# Patient Record
Sex: FEMALE | Race: BLACK OR AFRICAN AMERICAN | NOT HISPANIC OR LATINO | Employment: FULL TIME | URBAN - METROPOLITAN AREA
[De-identification: names, ages, dates, MRNs, and addresses within clinical notes are randomized per-mention and may not be internally consistent; named-entity substitution may affect disease eponyms.]

---

## 2023-05-30 ENCOUNTER — LAB (OUTPATIENT)
Dept: LAB | Facility: HOSPITAL | Age: 18
End: 2023-05-30

## 2023-05-30 ENCOUNTER — TRANSCRIBE ORDERS (OUTPATIENT)
Dept: ADMINISTRATIVE | Facility: HOSPITAL | Age: 18
End: 2023-05-30

## 2023-05-30 DIAGNOSIS — J30.9 SPASMODIC RHINORRHEA: ICD-10-CM

## 2023-05-30 DIAGNOSIS — L20.9 ATOPIC DERMATITIS, UNSPECIFIED TYPE: ICD-10-CM

## 2023-05-30 DIAGNOSIS — J45.20 ASTHMA, MILD INTERMITTENT, POORLY CONTROLLED: ICD-10-CM

## 2023-05-30 DIAGNOSIS — J45.20 ASTHMA, MILD INTERMITTENT, POORLY CONTROLLED: Primary | ICD-10-CM

## 2023-05-30 LAB
ALBUMIN SERPL-MCNC: 4.4 G/DL (ref 3.5–5.2)
ALBUMIN/GLOB SERPL: 1.5 G/DL
ALP SERPL-CCNC: 68 U/L (ref 39–117)
ALT SERPL W P-5'-P-CCNC: 15 U/L (ref 1–33)
ANION GAP SERPL CALCULATED.3IONS-SCNC: 8 MMOL/L (ref 5–15)
AST SERPL-CCNC: 26 U/L (ref 1–32)
BASOPHILS # BLD AUTO: 0.06 10*3/MM3 (ref 0–0.2)
BASOPHILS NFR BLD AUTO: 0.4 % (ref 0–1.5)
BILIRUB SERPL-MCNC: 0.5 MG/DL (ref 0–1.2)
BUN SERPL-MCNC: 5 MG/DL (ref 6–20)
BUN/CREAT SERPL: 6.5 (ref 7–25)
CALCIUM SPEC-SCNC: 9.4 MG/DL (ref 8.6–10.5)
CHLORIDE SERPL-SCNC: 107 MMOL/L (ref 98–107)
CO2 SERPL-SCNC: 27 MMOL/L (ref 22–29)
CREAT SERPL-MCNC: 0.77 MG/DL (ref 0.57–1)
DEPRECATED RDW RBC AUTO: 38.5 FL (ref 37–54)
EGFRCR SERPLBLD CKD-EPI 2021: 113.4 ML/MIN/1.73
EOSINOPHIL # BLD AUTO: 2.95 10*3/MM3 (ref 0–0.4)
EOSINOPHIL NFR BLD AUTO: 20.9 % (ref 0.3–6.2)
ERYTHROCYTE [DISTWIDTH] IN BLOOD BY AUTOMATED COUNT: 11.7 % (ref 12.3–15.4)
GLOBULIN UR ELPH-MCNC: 2.9 GM/DL
GLUCOSE SERPL-MCNC: 90 MG/DL (ref 65–99)
HCT VFR BLD AUTO: 35.2 % (ref 34–46.6)
HGB BLD-MCNC: 11.6 G/DL (ref 12–15.9)
IMM GRANULOCYTES # BLD AUTO: 0.03 10*3/MM3 (ref 0–0.05)
IMM GRANULOCYTES NFR BLD AUTO: 0.2 % (ref 0–0.5)
LYMPHOCYTES # BLD AUTO: 3.43 10*3/MM3 (ref 0.7–3.1)
LYMPHOCYTES NFR BLD AUTO: 24.3 % (ref 19.6–45.3)
MCH RBC QN AUTO: 30.3 PG (ref 26.6–33)
MCHC RBC AUTO-ENTMCNC: 33 G/DL (ref 31.5–35.7)
MCV RBC AUTO: 91.9 FL (ref 79–97)
MONOCYTES # BLD AUTO: 0.94 10*3/MM3 (ref 0.1–0.9)
MONOCYTES NFR BLD AUTO: 6.7 % (ref 5–12)
NEUTROPHILS NFR BLD AUTO: 47.5 % (ref 42.7–76)
NEUTROPHILS NFR BLD AUTO: 6.68 10*3/MM3 (ref 1.7–7)
NRBC BLD AUTO-RTO: 0 /100 WBC (ref 0–0.2)
PLATELET # BLD AUTO: 301 10*3/MM3 (ref 140–450)
PMV BLD AUTO: 11.1 FL (ref 6–12)
POTASSIUM SERPL-SCNC: 4.3 MMOL/L (ref 3.5–5.2)
PROT SERPL-MCNC: 7.3 G/DL (ref 6–8.5)
RBC # BLD AUTO: 3.83 10*6/MM3 (ref 3.77–5.28)
SODIUM SERPL-SCNC: 142 MMOL/L (ref 136–145)
WBC NRBC COR # BLD: 14.09 10*3/MM3 (ref 3.4–10.8)

## 2023-05-30 PROCEDURE — 80053 COMPREHEN METABOLIC PANEL: CPT

## 2023-05-30 PROCEDURE — 85025 COMPLETE CBC W/AUTO DIFF WBC: CPT

## 2023-05-30 PROCEDURE — 36415 COLL VENOUS BLD VENIPUNCTURE: CPT

## 2023-06-14 ENCOUNTER — HOSPITAL ENCOUNTER (OUTPATIENT)
Dept: GENERAL RADIOLOGY | Facility: HOSPITAL | Age: 18
Discharge: HOME | End: 2023-06-14
Payer: COMMERCIAL

## 2023-06-14 ENCOUNTER — TRANSCRIBE ORDERS (OUTPATIENT)
Dept: ADMINISTRATIVE | Facility: HOSPITAL | Age: 18
End: 2023-06-14
Payer: COMMERCIAL

## 2023-06-14 ENCOUNTER — LAB (OUTPATIENT)
Dept: LAB | Facility: HOSPITAL | Age: 18
End: 2023-06-14
Payer: COMMERCIAL

## 2023-06-14 DIAGNOSIS — J45.20 ASTHMA, MILD INTERMITTENT, POORLY CONTROLLED: Primary | ICD-10-CM

## 2023-06-14 DIAGNOSIS — L20.9 ATOPIC DERMATITIS, UNSPECIFIED TYPE: ICD-10-CM

## 2023-06-14 DIAGNOSIS — R05.1 ACUTE COUGH: ICD-10-CM

## 2023-06-14 DIAGNOSIS — R05.8 OTHER COUGH: ICD-10-CM

## 2023-06-14 DIAGNOSIS — J30.9 SPASMODIC RHINORRHEA: ICD-10-CM

## 2023-06-14 DIAGNOSIS — J45.20 ASTHMA, MILD INTERMITTENT, POORLY CONTROLLED: ICD-10-CM

## 2023-06-14 LAB
ALBUMIN SERPL-MCNC: 4.5 G/DL (ref 3.5–5.2)
ALBUMIN/GLOB SERPL: 1.4 G/DL
ALP SERPL-CCNC: 58 U/L (ref 39–117)
ALT SERPL W P-5'-P-CCNC: 13 U/L (ref 1–33)
ANION GAP SERPL CALCULATED.3IONS-SCNC: 13.4 MMOL/L (ref 5–15)
AST SERPL-CCNC: 13 U/L (ref 1–32)
BASOPHILS # BLD AUTO: 0.05 10*3/MM3 (ref 0–0.2)
BASOPHILS NFR BLD AUTO: 0.3 % (ref 0–1.5)
BILIRUB SERPL-MCNC: 0.5 MG/DL (ref 0–1.2)
BUN SERPL-MCNC: 14 MG/DL (ref 6–20)
BUN/CREAT SERPL: 13.7 (ref 7–25)
CALCIUM SPEC-SCNC: 9.8 MG/DL (ref 8.6–10.5)
CHLORIDE SERPL-SCNC: 103 MMOL/L (ref 98–107)
CHOLEST SERPL-MCNC: 200 MG/DL (ref 0–200)
CO2 SERPL-SCNC: 25.6 MMOL/L (ref 22–29)
CREAT SERPL-MCNC: 1.02 MG/DL (ref 0.57–1)
DEPRECATED RDW RBC AUTO: 39 FL (ref 37–54)
EGFRCR SERPLBLD CKD-EPI 2021: 80.9 ML/MIN/1.73
EOSINOPHIL # BLD AUTO: 0.05 10*3/MM3 (ref 0–0.4)
EOSINOPHIL NFR BLD AUTO: 0.3 % (ref 0.3–6.2)
ERYTHROCYTE [DISTWIDTH] IN BLOOD BY AUTOMATED COUNT: 11.5 % (ref 12.3–15.4)
GLOBULIN UR ELPH-MCNC: 3.3 GM/DL
GLUCOSE SERPL-MCNC: 81 MG/DL (ref 65–99)
HAV IGM SERPL QL IA: NORMAL
HBV CORE IGM SERPL QL IA: NORMAL
HBV SURFACE AG SERPL QL IA: NORMAL
HCT VFR BLD AUTO: 38.9 % (ref 34–46.6)
HCV AB SER DONR QL: NORMAL
HDLC SERPL-MCNC: 34 MG/DL (ref 40–60)
HGB BLD-MCNC: 12.8 G/DL (ref 12–15.9)
IMM GRANULOCYTES # BLD AUTO: 0.06 10*3/MM3 (ref 0–0.05)
IMM GRANULOCYTES NFR BLD AUTO: 0.4 % (ref 0–0.5)
LDLC SERPL CALC-MCNC: 119 MG/DL (ref 0–100)
LDLC/HDLC SERPL: 3.31 {RATIO}
LYMPHOCYTES # BLD AUTO: 5.84 10*3/MM3 (ref 0.7–3.1)
LYMPHOCYTES NFR BLD AUTO: 34.3 % (ref 19.6–45.3)
MCH RBC QN AUTO: 30 PG (ref 26.6–33)
MCHC RBC AUTO-ENTMCNC: 32.9 G/DL (ref 31.5–35.7)
MCV RBC AUTO: 91.3 FL (ref 79–97)
MONOCYTES # BLD AUTO: 0.99 10*3/MM3 (ref 0.1–0.9)
MONOCYTES NFR BLD AUTO: 5.8 % (ref 5–12)
NEUTROPHILS NFR BLD AUTO: 10.06 10*3/MM3 (ref 1.7–7)
NEUTROPHILS NFR BLD AUTO: 58.9 % (ref 42.7–76)
NRBC BLD AUTO-RTO: 0 /100 WBC (ref 0–0.2)
PLATELET # BLD AUTO: 389 10*3/MM3 (ref 140–450)
PMV BLD AUTO: 10.4 FL (ref 6–12)
POTASSIUM SERPL-SCNC: 3.7 MMOL/L (ref 3.5–5.2)
PROT SERPL-MCNC: 7.8 G/DL (ref 6–8.5)
RBC # BLD AUTO: 4.26 10*6/MM3 (ref 3.77–5.28)
SODIUM SERPL-SCNC: 142 MMOL/L (ref 136–145)
TRIGL SERPL-MCNC: 268 MG/DL (ref 0–150)
VLDLC SERPL-MCNC: 47 MG/DL (ref 5–40)
WBC NRBC COR # BLD: 17.05 10*3/MM3 (ref 3.4–10.8)

## 2023-06-14 PROCEDURE — 86480 TB TEST CELL IMMUN MEASURE: CPT

## 2023-06-14 PROCEDURE — 85025 COMPLETE CBC W/AUTO DIFF WBC: CPT

## 2023-06-14 PROCEDURE — 80053 COMPREHEN METABOLIC PANEL: CPT

## 2023-06-14 PROCEDURE — 36415 COLL VENOUS BLD VENIPUNCTURE: CPT

## 2023-06-14 PROCEDURE — 80061 LIPID PANEL: CPT

## 2023-06-14 PROCEDURE — 80074 ACUTE HEPATITIS PANEL: CPT

## 2023-06-14 PROCEDURE — 71046 X-RAY EXAM CHEST 2 VIEWS: CPT

## 2023-06-16 LAB
GAMMA INTERFERON BACKGROUND BLD IA-ACNC: 0 IU/ML
M TB IFN-G BLD-IMP: NEGATIVE
M TB IFN-G CD4+ T-CELLS BLD-ACNC: 0 IU/ML
M TBIFN-G CD4+ CD8+T-CELLS BLD-ACNC: 0.02 IU/ML
MITOGEN IGNF BLD-ACNC: >10 IU/ML
QUANTIFERON INCUBATION: NORMAL
SERVICE CMNT-IMP: NORMAL

## 2023-08-23 ENCOUNTER — LAB (OUTPATIENT)
Dept: LAB | Facility: HOSPITAL | Age: 18
End: 2023-08-23
Payer: COMMERCIAL

## 2023-08-23 ENCOUNTER — APPOINTMENT (OUTPATIENT)
Facility: HOSPITAL | Age: 18
End: 2023-08-23
Payer: COMMERCIAL

## 2023-08-23 ENCOUNTER — TRANSCRIBE ORDERS (OUTPATIENT)
Dept: ADMINISTRATIVE | Facility: HOSPITAL | Age: 18
End: 2023-08-23
Payer: COMMERCIAL

## 2023-08-23 DIAGNOSIS — L20.9 ATOPIC DERMATITIS, UNSPECIFIED TYPE: Primary | ICD-10-CM

## 2023-08-23 DIAGNOSIS — L20.9 ATOPIC DERMATITIS, UNSPECIFIED TYPE: ICD-10-CM

## 2023-08-23 LAB
ALBUMIN SERPL-MCNC: 4.3 G/DL (ref 3.5–5.2)
ALBUMIN/GLOB SERPL: 1.5 G/DL
ALP SERPL-CCNC: 50 U/L (ref 39–117)
ALT SERPL W P-5'-P-CCNC: 13 U/L (ref 1–33)
ANION GAP SERPL CALCULATED.3IONS-SCNC: 11.4 MMOL/L (ref 5–15)
AST SERPL-CCNC: 16 U/L (ref 1–32)
BASOPHILS # BLD AUTO: 0.05 10*3/MM3 (ref 0–0.2)
BASOPHILS NFR BLD AUTO: 0.5 % (ref 0–1.5)
BILIRUB SERPL-MCNC: 0.7 MG/DL (ref 0–1.2)
BUN SERPL-MCNC: 10 MG/DL (ref 6–20)
BUN/CREAT SERPL: 11.9 (ref 7–25)
CALCIUM SPEC-SCNC: 9.8 MG/DL (ref 8.6–10.5)
CHLORIDE SERPL-SCNC: 105 MMOL/L (ref 98–107)
CHOLEST SERPL-MCNC: 161 MG/DL (ref 0–200)
CO2 SERPL-SCNC: 24.6 MMOL/L (ref 22–29)
CREAT SERPL-MCNC: 0.84 MG/DL (ref 0.57–1)
DEPRECATED RDW RBC AUTO: 42.5 FL (ref 37–54)
EGFRCR SERPLBLD CKD-EPI 2021: 102.2 ML/MIN/1.73
EOSINOPHIL # BLD AUTO: 0.38 10*3/MM3 (ref 0–0.4)
EOSINOPHIL NFR BLD AUTO: 3.7 % (ref 0.3–6.2)
ERYTHROCYTE [DISTWIDTH] IN BLOOD BY AUTOMATED COUNT: 12.4 % (ref 12.3–15.4)
GLOBULIN UR ELPH-MCNC: 2.9 GM/DL
GLUCOSE SERPL-MCNC: 78 MG/DL (ref 65–99)
HCT VFR BLD AUTO: 36.1 % (ref 34–46.6)
HDLC SERPL-MCNC: 39 MG/DL (ref 40–60)
HGB BLD-MCNC: 11.8 G/DL (ref 12–15.9)
IMM GRANULOCYTES # BLD AUTO: 0.04 10*3/MM3 (ref 0–0.05)
IMM GRANULOCYTES NFR BLD AUTO: 0.4 % (ref 0–0.5)
LDLC SERPL CALC-MCNC: 93 MG/DL (ref 0–100)
LDLC/HDLC SERPL: 2.28 {RATIO}
LYMPHOCYTES # BLD AUTO: 3.14 10*3/MM3 (ref 0.7–3.1)
LYMPHOCYTES NFR BLD AUTO: 30.5 % (ref 19.6–45.3)
MCH RBC QN AUTO: 30.6 PG (ref 26.6–33)
MCHC RBC AUTO-ENTMCNC: 32.7 G/DL (ref 31.5–35.7)
MCV RBC AUTO: 93.8 FL (ref 79–97)
MONOCYTES # BLD AUTO: 0.62 10*3/MM3 (ref 0.1–0.9)
MONOCYTES NFR BLD AUTO: 6 % (ref 5–12)
NEUTROPHILS NFR BLD AUTO: 58.9 % (ref 42.7–76)
NEUTROPHILS NFR BLD AUTO: 6.05 10*3/MM3 (ref 1.7–7)
NRBC BLD AUTO-RTO: 0 /100 WBC (ref 0–0.2)
PLATELET # BLD AUTO: 277 10*3/MM3 (ref 140–450)
PMV BLD AUTO: 11.9 FL (ref 6–12)
POTASSIUM SERPL-SCNC: 3.8 MMOL/L (ref 3.5–5.2)
PROT SERPL-MCNC: 7.2 G/DL (ref 6–8.5)
RBC # BLD AUTO: 3.85 10*6/MM3 (ref 3.77–5.28)
SODIUM SERPL-SCNC: 141 MMOL/L (ref 136–145)
TRIGL SERPL-MCNC: 166 MG/DL (ref 0–150)
VLDLC SERPL-MCNC: 29 MG/DL (ref 5–40)
WBC NRBC COR # BLD: 10.28 10*3/MM3 (ref 3.4–10.8)

## 2023-08-23 PROCEDURE — 80061 LIPID PANEL: CPT

## 2023-08-23 PROCEDURE — 80053 COMPREHEN METABOLIC PANEL: CPT

## 2023-08-23 PROCEDURE — 72110 X-RAY EXAM L-2 SPINE 4/>VWS: CPT

## 2023-08-23 PROCEDURE — 36415 COLL VENOUS BLD VENIPUNCTURE: CPT

## 2023-08-23 PROCEDURE — 85025 COMPLETE CBC W/AUTO DIFF WBC: CPT

## 2023-11-21 ENCOUNTER — TRANSCRIBE ORDERS (OUTPATIENT)
Dept: ADMINISTRATIVE | Facility: HOSPITAL | Age: 18
End: 2023-11-21
Payer: COMMERCIAL

## 2023-11-21 ENCOUNTER — LAB (OUTPATIENT)
Dept: LAB | Facility: HOSPITAL | Age: 18
End: 2023-11-21
Payer: COMMERCIAL

## 2023-11-21 DIAGNOSIS — J30.1 ALLERGIC RHINITIS DUE TO POLLEN, UNSPECIFIED SEASONALITY: ICD-10-CM

## 2023-11-21 DIAGNOSIS — L20.9 ATOPIC DERMATITIS, UNSPECIFIED TYPE: ICD-10-CM

## 2023-11-21 DIAGNOSIS — J45.40 ASTHMA, MODERATE PERSISTENT, WELL-CONTROLLED: ICD-10-CM

## 2023-11-21 DIAGNOSIS — J45.40 ASTHMA, MODERATE PERSISTENT, WELL-CONTROLLED: Primary | ICD-10-CM

## 2023-11-21 LAB
ALBUMIN SERPL-MCNC: 4.7 G/DL (ref 3.5–5.2)
ALBUMIN/GLOB SERPL: 1.7 G/DL
ALP SERPL-CCNC: 59 U/L (ref 39–117)
ALT SERPL W P-5'-P-CCNC: 14 U/L (ref 1–33)
ANION GAP SERPL CALCULATED.3IONS-SCNC: 10.2 MMOL/L (ref 5–15)
AST SERPL-CCNC: 16 U/L (ref 1–32)
BASOPHILS # BLD AUTO: 0.07 10*3/MM3 (ref 0–0.2)
BASOPHILS NFR BLD AUTO: 0.6 % (ref 0–1.5)
BILIRUB SERPL-MCNC: 0.2 MG/DL (ref 0–1.2)
BUN SERPL-MCNC: 6 MG/DL (ref 6–20)
BUN/CREAT SERPL: 8.2 (ref 7–25)
CALCIUM SPEC-SCNC: 9.3 MG/DL (ref 8.6–10.5)
CHLORIDE SERPL-SCNC: 104 MMOL/L (ref 98–107)
CHOLEST SERPL-MCNC: 171 MG/DL (ref 0–200)
CO2 SERPL-SCNC: 26.8 MMOL/L (ref 22–29)
CREAT SERPL-MCNC: 0.73 MG/DL (ref 0.57–1)
DEPRECATED RDW RBC AUTO: 43 FL (ref 37–54)
EGFRCR SERPLBLD CKD-EPI 2021: 120.9 ML/MIN/1.73
EOSINOPHIL # BLD AUTO: 0.32 10*3/MM3 (ref 0–0.4)
EOSINOPHIL NFR BLD AUTO: 2.6 % (ref 0.3–6.2)
ERYTHROCYTE [DISTWIDTH] IN BLOOD BY AUTOMATED COUNT: 12.3 % (ref 12.3–15.4)
GLOBULIN UR ELPH-MCNC: 2.8 GM/DL
GLUCOSE SERPL-MCNC: 100 MG/DL (ref 65–99)
HCT VFR BLD AUTO: 33.4 % (ref 34–46.6)
HDLC SERPL-MCNC: 33 MG/DL (ref 40–60)
HGB BLD-MCNC: 11.4 G/DL (ref 12–15.9)
IMM GRANULOCYTES # BLD AUTO: 0.03 10*3/MM3 (ref 0–0.05)
IMM GRANULOCYTES NFR BLD AUTO: 0.2 % (ref 0–0.5)
LDLC SERPL CALC-MCNC: 68 MG/DL (ref 0–100)
LDLC/HDLC SERPL: 1.47 {RATIO}
LYMPHOCYTES # BLD AUTO: 3.94 10*3/MM3 (ref 0.7–3.1)
LYMPHOCYTES NFR BLD AUTO: 32.5 % (ref 19.6–45.3)
MCH RBC QN AUTO: 32.1 PG (ref 26.6–33)
MCHC RBC AUTO-ENTMCNC: 34.1 G/DL (ref 31.5–35.7)
MCV RBC AUTO: 94.1 FL (ref 79–97)
MONOCYTES # BLD AUTO: 0.73 10*3/MM3 (ref 0.1–0.9)
MONOCYTES NFR BLD AUTO: 6 % (ref 5–12)
NEUTROPHILS NFR BLD AUTO: 58.1 % (ref 42.7–76)
NEUTROPHILS NFR BLD AUTO: 7.02 10*3/MM3 (ref 1.7–7)
NRBC BLD AUTO-RTO: 0 /100 WBC (ref 0–0.2)
PLATELET # BLD AUTO: 298 10*3/MM3 (ref 140–450)
PMV BLD AUTO: 11.2 FL (ref 6–12)
POTASSIUM SERPL-SCNC: 3.9 MMOL/L (ref 3.5–5.2)
PROT SERPL-MCNC: 7.5 G/DL (ref 6–8.5)
RBC # BLD AUTO: 3.55 10*6/MM3 (ref 3.77–5.28)
SODIUM SERPL-SCNC: 141 MMOL/L (ref 136–145)
TRIGL SERPL-MCNC: 447 MG/DL (ref 0–150)
VLDLC SERPL-MCNC: 70 MG/DL (ref 5–40)
WBC NRBC COR # BLD AUTO: 12.11 10*3/MM3 (ref 3.4–10.8)

## 2023-11-21 PROCEDURE — 80053 COMPREHEN METABOLIC PANEL: CPT

## 2023-11-21 PROCEDURE — 80061 LIPID PANEL: CPT

## 2023-11-21 PROCEDURE — 85025 COMPLETE CBC W/AUTO DIFF WBC: CPT

## 2023-11-21 PROCEDURE — 36415 COLL VENOUS BLD VENIPUNCTURE: CPT

## 2024-02-19 ENCOUNTER — TRANSCRIBE ORDERS (OUTPATIENT)
Dept: ADMINISTRATIVE | Facility: HOSPITAL | Age: 19
End: 2024-02-19
Payer: COMMERCIAL

## 2024-02-19 ENCOUNTER — LAB (OUTPATIENT)
Facility: HOSPITAL | Age: 19
End: 2024-02-19
Payer: COMMERCIAL

## 2024-02-19 DIAGNOSIS — L20.9 ATOPIC DERMATITIS, UNSPECIFIED TYPE: Primary | ICD-10-CM

## 2024-02-19 DIAGNOSIS — J30.1 ALLERGIC RHINITIS DUE TO POLLEN, UNSPECIFIED SEASONALITY: ICD-10-CM

## 2024-02-19 DIAGNOSIS — J45.40 MODERATE PERSISTENT ASTHMA, UNCOMPLICATED: ICD-10-CM

## 2024-02-19 DIAGNOSIS — L20.9 ATOPIC DERMATITIS, UNSPECIFIED TYPE: ICD-10-CM

## 2024-02-19 LAB
ALBUMIN SERPL-MCNC: 4.4 G/DL (ref 3.5–5.2)
ALBUMIN/GLOB SERPL: 1.5 G/DL
ALP SERPL-CCNC: 53 U/L (ref 39–117)
ALT SERPL W P-5'-P-CCNC: 24 U/L (ref 1–33)
ANION GAP SERPL CALCULATED.3IONS-SCNC: 11.6 MMOL/L (ref 5–15)
AST SERPL-CCNC: 22 U/L (ref 1–32)
BASOPHILS # BLD AUTO: 0.06 10*3/MM3 (ref 0–0.2)
BASOPHILS NFR BLD AUTO: 0.6 % (ref 0–1.5)
BILIRUB SERPL-MCNC: 0.4 MG/DL (ref 0–1.2)
BUN SERPL-MCNC: 7 MG/DL (ref 6–20)
BUN/CREAT SERPL: 8.9 (ref 7–25)
CALCIUM SPEC-SCNC: 9.4 MG/DL (ref 8.6–10.5)
CHLORIDE SERPL-SCNC: 105 MMOL/L (ref 98–107)
CHOLEST SERPL-MCNC: 180 MG/DL (ref 0–200)
CO2 SERPL-SCNC: 25.4 MMOL/L (ref 22–29)
CREAT SERPL-MCNC: 0.79 MG/DL (ref 0.57–1)
DEPRECATED RDW RBC AUTO: 41.4 FL (ref 37–54)
EGFRCR SERPLBLD CKD-EPI 2021: 109.3 ML/MIN/1.73
EOSINOPHIL # BLD AUTO: 0.32 10*3/MM3 (ref 0–0.4)
EOSINOPHIL NFR BLD AUTO: 3.2 % (ref 0.3–6.2)
ERYTHROCYTE [DISTWIDTH] IN BLOOD BY AUTOMATED COUNT: 12.2 % (ref 12.3–15.4)
GLOBULIN UR ELPH-MCNC: 2.9 GM/DL
GLUCOSE SERPL-MCNC: 104 MG/DL (ref 65–99)
HCT VFR BLD AUTO: 34.3 % (ref 34–46.6)
HDLC SERPL-MCNC: 38 MG/DL (ref 40–60)
HGB BLD-MCNC: 11.5 G/DL (ref 12–15.9)
IMM GRANULOCYTES # BLD AUTO: 0.02 10*3/MM3 (ref 0–0.05)
IMM GRANULOCYTES NFR BLD AUTO: 0.2 % (ref 0–0.5)
LDLC SERPL CALC-MCNC: 83 MG/DL (ref 0–100)
LDLC/HDLC SERPL: 1.84 {RATIO}
LYMPHOCYTES # BLD AUTO: 3.73 10*3/MM3 (ref 0.7–3.1)
LYMPHOCYTES NFR BLD AUTO: 37 % (ref 19.6–45.3)
MCH RBC QN AUTO: 31.3 PG (ref 26.6–33)
MCHC RBC AUTO-ENTMCNC: 33.5 G/DL (ref 31.5–35.7)
MCV RBC AUTO: 93.5 FL (ref 79–97)
MONOCYTES # BLD AUTO: 0.62 10*3/MM3 (ref 0.1–0.9)
MONOCYTES NFR BLD AUTO: 6.2 % (ref 5–12)
NEUTROPHILS NFR BLD AUTO: 5.33 10*3/MM3 (ref 1.7–7)
NEUTROPHILS NFR BLD AUTO: 52.8 % (ref 42.7–76)
NRBC BLD AUTO-RTO: 0 /100 WBC (ref 0–0.2)
PLATELET # BLD AUTO: 280 10*3/MM3 (ref 140–450)
PMV BLD AUTO: 11.5 FL (ref 6–12)
POTASSIUM SERPL-SCNC: 4.4 MMOL/L (ref 3.5–5.2)
PROT SERPL-MCNC: 7.3 G/DL (ref 6–8.5)
RBC # BLD AUTO: 3.67 10*6/MM3 (ref 3.77–5.28)
SODIUM SERPL-SCNC: 142 MMOL/L (ref 136–145)
TRIGL SERPL-MCNC: 360 MG/DL (ref 0–150)
VLDLC SERPL-MCNC: 59 MG/DL (ref 5–40)
WBC NRBC COR # BLD AUTO: 10.08 10*3/MM3 (ref 3.4–10.8)

## 2024-02-19 PROCEDURE — 80053 COMPREHEN METABOLIC PANEL: CPT

## 2024-02-19 PROCEDURE — 36415 COLL VENOUS BLD VENIPUNCTURE: CPT

## 2024-02-19 PROCEDURE — 80061 LIPID PANEL: CPT

## 2024-02-19 PROCEDURE — 85025 COMPLETE CBC W/AUTO DIFF WBC: CPT

## 2024-08-20 ENCOUNTER — LAB (OUTPATIENT)
Dept: LAB | Facility: HOSPITAL | Age: 19
End: 2024-08-20
Payer: COMMERCIAL

## 2024-08-20 ENCOUNTER — TRANSCRIBE ORDERS (OUTPATIENT)
Dept: ADMINISTRATIVE | Facility: HOSPITAL | Age: 19
End: 2024-08-20
Payer: COMMERCIAL

## 2024-08-20 DIAGNOSIS — J30.1 ALLERGIC RHINITIS DUE TO POLLEN, UNSPECIFIED SEASONALITY: ICD-10-CM

## 2024-08-20 DIAGNOSIS — J45.40 MODERATE PERSISTENT ASTHMA, UNCOMPLICATED: ICD-10-CM

## 2024-08-20 DIAGNOSIS — L20.9 ATOPIC DERMATITIS, UNSPECIFIED TYPE: ICD-10-CM

## 2024-08-20 DIAGNOSIS — L20.9 ATOPIC DERMATITIS, UNSPECIFIED TYPE: Primary | ICD-10-CM

## 2024-08-20 LAB
ALBUMIN SERPL-MCNC: 4.7 G/DL (ref 3.5–5.2)
ALBUMIN/GLOB SERPL: 1.7 G/DL
ALP SERPL-CCNC: 59 U/L (ref 39–117)
ALT SERPL W P-5'-P-CCNC: 59 U/L (ref 1–33)
ANION GAP SERPL CALCULATED.3IONS-SCNC: 11.8 MMOL/L (ref 5–15)
AST SERPL-CCNC: 53 U/L (ref 1–32)
BASOPHILS # BLD AUTO: 0.04 10*3/MM3 (ref 0–0.2)
BASOPHILS NFR BLD AUTO: 0.5 % (ref 0–1.5)
BILIRUB SERPL-MCNC: 0.5 MG/DL (ref 0–1.2)
BUN SERPL-MCNC: 7 MG/DL (ref 6–20)
BUN/CREAT SERPL: 9.2 (ref 7–25)
CALCIUM SPEC-SCNC: 9.2 MG/DL (ref 8.6–10.5)
CHLORIDE SERPL-SCNC: 101 MMOL/L (ref 98–107)
CHOLEST SERPL-MCNC: 162 MG/DL (ref 0–200)
CO2 SERPL-SCNC: 24.2 MMOL/L (ref 22–29)
CREAT SERPL-MCNC: 0.76 MG/DL (ref 0.57–1)
DEPRECATED RDW RBC AUTO: 43.5 FL (ref 37–54)
EGFRCR SERPLBLD CKD-EPI 2021: 114.5 ML/MIN/1.73
EOSINOPHIL # BLD AUTO: 0.34 10*3/MM3 (ref 0–0.4)
EOSINOPHIL NFR BLD AUTO: 3.9 % (ref 0.3–6.2)
ERYTHROCYTE [DISTWIDTH] IN BLOOD BY AUTOMATED COUNT: 12.5 % (ref 12.3–15.4)
GLOBULIN UR ELPH-MCNC: 2.8 GM/DL
GLUCOSE SERPL-MCNC: 96 MG/DL (ref 65–99)
HCT VFR BLD AUTO: 36 % (ref 34–46.6)
HDLC SERPL-MCNC: 35 MG/DL (ref 40–60)
HGB BLD-MCNC: 12 G/DL (ref 12–15.9)
IMM GRANULOCYTES # BLD AUTO: 0.03 10*3/MM3 (ref 0–0.05)
IMM GRANULOCYTES NFR BLD AUTO: 0.3 % (ref 0–0.5)
LDLC SERPL CALC-MCNC: 72 MG/DL (ref 0–100)
LDLC/HDLC SERPL: 1.65 {RATIO}
LYMPHOCYTES # BLD AUTO: 3.01 10*3/MM3 (ref 0.7–3.1)
LYMPHOCYTES NFR BLD AUTO: 34.4 % (ref 19.6–45.3)
MCH RBC QN AUTO: 32 PG (ref 26.6–33)
MCHC RBC AUTO-ENTMCNC: 33.3 G/DL (ref 31.5–35.7)
MCV RBC AUTO: 96 FL (ref 79–97)
MONOCYTES # BLD AUTO: 0.53 10*3/MM3 (ref 0.1–0.9)
MONOCYTES NFR BLD AUTO: 6.1 % (ref 5–12)
NEUTROPHILS NFR BLD AUTO: 4.81 10*3/MM3 (ref 1.7–7)
NEUTROPHILS NFR BLD AUTO: 54.8 % (ref 42.7–76)
NRBC BLD AUTO-RTO: 0 /100 WBC (ref 0–0.2)
PLATELET # BLD AUTO: 252 10*3/MM3 (ref 140–450)
PMV BLD AUTO: 11.5 FL (ref 6–12)
POTASSIUM SERPL-SCNC: 3.8 MMOL/L (ref 3.5–5.2)
PROT SERPL-MCNC: 7.5 G/DL (ref 6–8.5)
RBC # BLD AUTO: 3.75 10*6/MM3 (ref 3.77–5.28)
SODIUM SERPL-SCNC: 137 MMOL/L (ref 136–145)
TRIGL SERPL-MCNC: 347 MG/DL (ref 0–150)
VLDLC SERPL-MCNC: 55 MG/DL (ref 5–40)
WBC NRBC COR # BLD AUTO: 8.76 10*3/MM3 (ref 3.4–10.8)

## 2024-08-20 PROCEDURE — 80053 COMPREHEN METABOLIC PANEL: CPT

## 2024-08-20 PROCEDURE — 85025 COMPLETE CBC W/AUTO DIFF WBC: CPT

## 2024-08-20 PROCEDURE — 80061 LIPID PANEL: CPT

## 2024-08-20 PROCEDURE — 36415 COLL VENOUS BLD VENIPUNCTURE: CPT

## 2024-09-25 ENCOUNTER — TRANSCRIBE ORDERS (OUTPATIENT)
Dept: ADMINISTRATIVE | Facility: HOSPITAL | Age: 19
End: 2024-09-25
Payer: COMMERCIAL

## 2024-09-25 ENCOUNTER — LAB (OUTPATIENT)
Dept: LAB | Facility: HOSPITAL | Age: 19
End: 2024-09-25
Payer: COMMERCIAL

## 2024-09-25 DIAGNOSIS — L20.9 ATOPIC DERMATITIS, UNSPECIFIED TYPE: ICD-10-CM

## 2024-09-25 DIAGNOSIS — J30.1 ALLERGIC RHINITIS DUE TO POLLEN, UNSPECIFIED SEASONALITY: ICD-10-CM

## 2024-09-25 DIAGNOSIS — J45.40 MODERATE PERSISTENT ASTHMA, UNCOMPLICATED: ICD-10-CM

## 2024-09-25 DIAGNOSIS — J45.40 MODERATE PERSISTENT ASTHMA, UNCOMPLICATED: Primary | ICD-10-CM

## 2024-09-25 LAB
ALBUMIN SERPL-MCNC: 4.9 G/DL (ref 3.5–5.2)
ALBUMIN/GLOB SERPL: 1.8 G/DL
ALP SERPL-CCNC: 69 U/L (ref 39–117)
ALT SERPL W P-5'-P-CCNC: 87 U/L (ref 1–33)
ANION GAP SERPL CALCULATED.3IONS-SCNC: 14 MMOL/L (ref 5–15)
AST SERPL-CCNC: 69 U/L (ref 1–32)
BASOPHILS # BLD AUTO: 0.06 10*3/MM3 (ref 0–0.2)
BASOPHILS NFR BLD AUTO: 0.5 % (ref 0–1.5)
BILIRUB SERPL-MCNC: 0.6 MG/DL (ref 0–1.2)
BUN SERPL-MCNC: 10 MG/DL (ref 6–20)
BUN/CREAT SERPL: 12.2 (ref 7–25)
CALCIUM SPEC-SCNC: 10 MG/DL (ref 8.6–10.5)
CHLORIDE SERPL-SCNC: 100 MMOL/L (ref 98–107)
CO2 SERPL-SCNC: 26 MMOL/L (ref 22–29)
CREAT SERPL-MCNC: 0.82 MG/DL (ref 0.57–1)
DEPRECATED RDW RBC AUTO: 45.5 FL (ref 37–54)
EGFRCR SERPLBLD CKD-EPI 2021: 104.5 ML/MIN/1.73
EOSINOPHIL # BLD AUTO: 0.24 10*3/MM3 (ref 0–0.4)
EOSINOPHIL NFR BLD AUTO: 2 % (ref 0.3–6.2)
ERYTHROCYTE [DISTWIDTH] IN BLOOD BY AUTOMATED COUNT: 12.5 % (ref 12.3–15.4)
GLOBULIN UR ELPH-MCNC: 2.8 GM/DL
GLUCOSE SERPL-MCNC: 102 MG/DL (ref 65–99)
HCT VFR BLD AUTO: 38.8 % (ref 34–46.6)
HGB BLD-MCNC: 12.4 G/DL (ref 12–15.9)
IMM GRANULOCYTES # BLD AUTO: 0.06 10*3/MM3 (ref 0–0.05)
IMM GRANULOCYTES NFR BLD AUTO: 0.5 % (ref 0–0.5)
LYMPHOCYTES # BLD AUTO: 4.06 10*3/MM3 (ref 0.7–3.1)
LYMPHOCYTES NFR BLD AUTO: 34.3 % (ref 19.6–45.3)
MCH RBC QN AUTO: 31.4 PG (ref 26.6–33)
MCHC RBC AUTO-ENTMCNC: 32 G/DL (ref 31.5–35.7)
MCV RBC AUTO: 98.2 FL (ref 79–97)
MONOCYTES # BLD AUTO: 0.69 10*3/MM3 (ref 0.1–0.9)
MONOCYTES NFR BLD AUTO: 5.8 % (ref 5–12)
NEUTROPHILS NFR BLD AUTO: 56.9 % (ref 42.7–76)
NEUTROPHILS NFR BLD AUTO: 6.74 10*3/MM3 (ref 1.7–7)
NRBC BLD AUTO-RTO: 0 /100 WBC (ref 0–0.2)
PLATELET # BLD AUTO: 270 10*3/MM3 (ref 140–450)
PMV BLD AUTO: 12.4 FL (ref 6–12)
POTASSIUM SERPL-SCNC: 3.9 MMOL/L (ref 3.5–5.2)
PROT SERPL-MCNC: 7.7 G/DL (ref 6–8.5)
RBC # BLD AUTO: 3.95 10*6/MM3 (ref 3.77–5.28)
SODIUM SERPL-SCNC: 140 MMOL/L (ref 136–145)
WBC NRBC COR # BLD AUTO: 11.85 10*3/MM3 (ref 3.4–10.8)

## 2024-09-25 PROCEDURE — 36415 COLL VENOUS BLD VENIPUNCTURE: CPT

## 2024-09-25 PROCEDURE — 80053 COMPREHEN METABOLIC PANEL: CPT

## 2024-09-25 PROCEDURE — 85025 COMPLETE CBC W/AUTO DIFF WBC: CPT

## 2024-11-11 ENCOUNTER — TRANSCRIBE ORDERS (OUTPATIENT)
Dept: LAB | Facility: HOSPITAL | Age: 19
End: 2024-11-11
Payer: COMMERCIAL

## 2024-11-11 ENCOUNTER — LAB (OUTPATIENT)
Dept: LAB | Facility: HOSPITAL | Age: 19
End: 2024-11-11
Payer: COMMERCIAL

## 2024-11-11 DIAGNOSIS — L20.9 ATOPIC DERMATITIS, UNSPECIFIED TYPE: Primary | ICD-10-CM

## 2024-11-11 DIAGNOSIS — L20.9 ATOPIC DERMATITIS, UNSPECIFIED TYPE: ICD-10-CM

## 2024-11-11 LAB
ALBUMIN SERPL-MCNC: 4.4 G/DL (ref 3.5–5.2)
ALBUMIN/GLOB SERPL: 1.5 G/DL
ALP SERPL-CCNC: 73 U/L (ref 39–117)
ALT SERPL W P-5'-P-CCNC: 68 U/L (ref 1–33)
ANION GAP SERPL CALCULATED.3IONS-SCNC: 12.4 MMOL/L (ref 5–15)
AST SERPL-CCNC: 55 U/L (ref 1–32)
BILIRUB SERPL-MCNC: 0.5 MG/DL (ref 0–1.2)
BUN SERPL-MCNC: 7 MG/DL (ref 6–20)
BUN/CREAT SERPL: 7 (ref 7–25)
CALCIUM SPEC-SCNC: 9.3 MG/DL (ref 8.6–10.5)
CHLORIDE SERPL-SCNC: 103 MMOL/L (ref 98–107)
CO2 SERPL-SCNC: 25.6 MMOL/L (ref 22–29)
CREAT SERPL-MCNC: 1 MG/DL (ref 0.57–1)
EGFRCR SERPLBLD CKD-EPI 2021: 82.4 ML/MIN/1.73
GLOBULIN UR ELPH-MCNC: 3 GM/DL
GLUCOSE SERPL-MCNC: 124 MG/DL (ref 65–99)
POTASSIUM SERPL-SCNC: 4.2 MMOL/L (ref 3.5–5.2)
PROT SERPL-MCNC: 7.4 G/DL (ref 6–8.5)
SODIUM SERPL-SCNC: 141 MMOL/L (ref 136–145)

## 2024-11-11 PROCEDURE — 36415 COLL VENOUS BLD VENIPUNCTURE: CPT

## 2024-11-11 PROCEDURE — 80053 COMPREHEN METABOLIC PANEL: CPT

## 2024-11-19 ENCOUNTER — HOSPITAL ENCOUNTER (OUTPATIENT)
Facility: HOSPITAL | Age: 19
Setting detail: OBSERVATION
Discharge: HOME | End: 2024-11-20
Attending: STUDENT IN AN ORGANIZED HEALTH CARE EDUCATION/TRAINING PROGRAM | Admitting: EMERGENCY MEDICINE
Payer: COMMERCIAL

## 2024-11-19 ENCOUNTER — APPOINTMENT (OUTPATIENT)
Dept: CT IMAGING | Facility: HOSPITAL | Age: 19
End: 2024-11-19
Payer: COMMERCIAL

## 2024-11-19 DIAGNOSIS — M25.551 ACUTE RIGHT HIP PAIN: ICD-10-CM

## 2024-11-19 DIAGNOSIS — M70.61 TROCHANTERIC BURSITIS OF RIGHT HIP: ICD-10-CM

## 2024-11-19 DIAGNOSIS — R52 INTRACTABLE PAIN: ICD-10-CM

## 2024-11-19 DIAGNOSIS — M25.551 RIGHT HIP PAIN: ICD-10-CM

## 2024-11-19 DIAGNOSIS — S73.191A TEAR OF RIGHT ACETABULAR LABRUM, INITIAL ENCOUNTER: Primary | ICD-10-CM

## 2024-11-19 LAB
ALBUMIN SERPL-MCNC: 4.9 G/DL (ref 3.5–5.2)
ALBUMIN/GLOB SERPL: 1.7 G/DL
ALP SERPL-CCNC: 64 U/L (ref 39–117)
ALT SERPL W P-5'-P-CCNC: 94 U/L (ref 1–33)
ANION GAP SERPL CALCULATED.3IONS-SCNC: 14 MMOL/L (ref 5–15)
AST SERPL-CCNC: 109 U/L (ref 1–32)
BASOPHILS # BLD AUTO: 0.04 10*3/MM3 (ref 0–0.2)
BASOPHILS NFR BLD AUTO: 0.3 % (ref 0–1.5)
BILIRUB SERPL-MCNC: 0.8 MG/DL (ref 0–1.2)
BUN SERPL-MCNC: 16 MG/DL (ref 6–20)
BUN/CREAT SERPL: 17.4 (ref 7–25)
CALCIUM SPEC-SCNC: 9.9 MG/DL (ref 8.6–10.5)
CHLORIDE SERPL-SCNC: 102 MMOL/L (ref 98–107)
CO2 SERPL-SCNC: 23 MMOL/L (ref 22–29)
CREAT SERPL-MCNC: 0.92 MG/DL (ref 0.57–1)
CRP SERPL-MCNC: 0.37 MG/DL (ref 0–0.5)
DEPRECATED RDW RBC AUTO: 45.5 FL (ref 37–54)
EGFRCR SERPLBLD CKD-EPI 2021: 91 ML/MIN/1.73
EOSINOPHIL # BLD AUTO: 0.2 10*3/MM3 (ref 0–0.4)
EOSINOPHIL NFR BLD AUTO: 1.6 % (ref 0.3–6.2)
ERYTHROCYTE [DISTWIDTH] IN BLOOD BY AUTOMATED COUNT: 12.9 % (ref 12.3–15.4)
ERYTHROCYTE [SEDIMENTATION RATE] IN BLOOD: 7 MM/HR (ref 0–20)
GLOBULIN UR ELPH-MCNC: 2.9 GM/DL
GLUCOSE SERPL-MCNC: 102 MG/DL (ref 65–99)
HCG SERPL QL: NEGATIVE
HCT VFR BLD AUTO: 36.1 % (ref 34–46.6)
HGB BLD-MCNC: 12.3 G/DL (ref 12–15.9)
IMM GRANULOCYTES # BLD AUTO: 0.04 10*3/MM3 (ref 0–0.05)
IMM GRANULOCYTES NFR BLD AUTO: 0.3 % (ref 0–0.5)
LYMPHOCYTES # BLD AUTO: 1.88 10*3/MM3 (ref 0.7–3.1)
LYMPHOCYTES NFR BLD AUTO: 14.8 % (ref 19.6–45.3)
MCH RBC QN AUTO: 32.6 PG (ref 26.6–33)
MCHC RBC AUTO-ENTMCNC: 34.1 G/DL (ref 31.5–35.7)
MCV RBC AUTO: 95.8 FL (ref 79–97)
MONOCYTES # BLD AUTO: 0.86 10*3/MM3 (ref 0.1–0.9)
MONOCYTES NFR BLD AUTO: 6.8 % (ref 5–12)
NEUTROPHILS NFR BLD AUTO: 76.2 % (ref 42.7–76)
NEUTROPHILS NFR BLD AUTO: 9.66 10*3/MM3 (ref 1.7–7)
NRBC BLD AUTO-RTO: 0 /100 WBC (ref 0–0.2)
PLATELET # BLD AUTO: 286 10*3/MM3 (ref 140–450)
PMV BLD AUTO: 11.6 FL (ref 6–12)
POTASSIUM SERPL-SCNC: 3.8 MMOL/L (ref 3.5–5.2)
PROT SERPL-MCNC: 7.8 G/DL (ref 6–8.5)
RBC # BLD AUTO: 3.77 10*6/MM3 (ref 3.77–5.28)
SODIUM SERPL-SCNC: 139 MMOL/L (ref 136–145)
WBC NRBC COR # BLD AUTO: 12.68 10*3/MM3 (ref 3.4–10.8)

## 2024-11-19 PROCEDURE — 86140 C-REACTIVE PROTEIN: CPT | Performed by: STUDENT IN AN ORGANIZED HEALTH CARE EDUCATION/TRAINING PROGRAM

## 2024-11-19 PROCEDURE — 25010000002 HYDROMORPHONE PER 4 MG: Performed by: EMERGENCY MEDICINE

## 2024-11-19 PROCEDURE — 25010000002 MORPHINE PER 10 MG: Performed by: STUDENT IN AN ORGANIZED HEALTH CARE EDUCATION/TRAINING PROGRAM

## 2024-11-19 PROCEDURE — 84703 CHORIONIC GONADOTROPIN ASSAY: CPT | Performed by: PHYSICIAN ASSISTANT

## 2024-11-19 PROCEDURE — 99285 EMERGENCY DEPT VISIT HI MDM: CPT

## 2024-11-19 PROCEDURE — G0378 HOSPITAL OBSERVATION PER HR: HCPCS

## 2024-11-19 PROCEDURE — 96374 THER/PROPH/DIAG INJ IV PUSH: CPT

## 2024-11-19 PROCEDURE — 85025 COMPLETE CBC W/AUTO DIFF WBC: CPT | Performed by: STUDENT IN AN ORGANIZED HEALTH CARE EDUCATION/TRAINING PROGRAM

## 2024-11-19 PROCEDURE — 72192 CT PELVIS W/O DYE: CPT

## 2024-11-19 PROCEDURE — 25010000002 KETOROLAC TROMETHAMINE PER 15 MG: Performed by: STUDENT IN AN ORGANIZED HEALTH CARE EDUCATION/TRAINING PROGRAM

## 2024-11-19 PROCEDURE — 80053 COMPREHEN METABOLIC PANEL: CPT | Performed by: STUDENT IN AN ORGANIZED HEALTH CARE EDUCATION/TRAINING PROGRAM

## 2024-11-19 PROCEDURE — 85652 RBC SED RATE AUTOMATED: CPT | Performed by: STUDENT IN AN ORGANIZED HEALTH CARE EDUCATION/TRAINING PROGRAM

## 2024-11-19 PROCEDURE — 96375 TX/PRO/DX INJ NEW DRUG ADDON: CPT

## 2024-11-19 RX ORDER — SODIUM CHLORIDE 0.9 % (FLUSH) 0.9 %
10 SYRINGE (ML) INJECTION AS NEEDED
Status: DISCONTINUED | OUTPATIENT
Start: 2024-11-19 | End: 2024-11-20 | Stop reason: HOSPADM

## 2024-11-19 RX ORDER — LIDOCAINE 50 MG/G
1 PATCH TOPICAL EVERY 24 HOURS
Qty: 6 PATCH | Refills: 0 | Status: SHIPPED | OUTPATIENT
Start: 2024-11-19

## 2024-11-19 RX ORDER — HYDROMORPHONE HYDROCHLORIDE 1 MG/ML
0.5 INJECTION, SOLUTION INTRAMUSCULAR; INTRAVENOUS; SUBCUTANEOUS ONCE
Status: COMPLETED | OUTPATIENT
Start: 2024-11-19 | End: 2024-11-19

## 2024-11-19 RX ORDER — KETOROLAC TROMETHAMINE 30 MG/ML
30 INJECTION, SOLUTION INTRAMUSCULAR; INTRAVENOUS ONCE
Status: COMPLETED | OUTPATIENT
Start: 2024-11-19 | End: 2024-11-19

## 2024-11-19 RX ORDER — MORPHINE SULFATE 2 MG/ML
4 INJECTION, SOLUTION INTRAMUSCULAR; INTRAVENOUS ONCE
Status: COMPLETED | OUTPATIENT
Start: 2024-11-19 | End: 2024-11-19

## 2024-11-19 RX ADMIN — MORPHINE SULFATE 4 MG: 2 INJECTION, SOLUTION INTRAMUSCULAR; INTRAVENOUS at 16:58

## 2024-11-19 RX ADMIN — HYDROMORPHONE HYDROCHLORIDE 0.5 MG: 1 INJECTION, SOLUTION INTRAMUSCULAR; INTRAVENOUS; SUBCUTANEOUS at 22:05

## 2024-11-19 RX ADMIN — KETOROLAC TROMETHAMINE 30 MG: 30 INJECTION, SOLUTION INTRAMUSCULAR at 16:58

## 2024-11-19 NOTE — ED PROVIDER NOTES
EMERGENCY DEPARTMENT ENCOUNTER      Room Number:  02/02  PCP: Zoya Jeffries MD  Independent Historians: Patient and Family  Patient Care Team:  Zoya Jeffries MD as PCP - General (Family Medicine)       HPI:  Chief Complaint: Hip pain    A complete HPI/ROS/PMH/PSH/SH/FH are unobtainable due to: None    Chronic or social conditions impacting patient care (Social Determinants of Health): None      Context: Sherry Arriaga is a 19 y.o. female with a PMH significant for obesity who presents to the ED c/o acute right hip pain for 2 weeks. Pt states she was walking and noticed her right hip began to hurt. Since then, the pain has gotten progressively worse which prompted her to come to the ED today with 10/10 pain. Pt states she went to urgent care on 11/11 and was prescribed ibuprofen and baclofen but has not noticed any relief. The pain is localized to the lateral aspect of the right hip and does not radiate. No numbness or tingling in her feet.  No fever, chills or prior operations to the affected hip.  No recent illnesses.      Upon review of prior external notes (non-ED) -and- Review of prior external test results outside of this encounter it appears the patient was evaluated in the office with family medicine for chronic right-sided low back pain on/13/23.  The patient had a normal hepatitis panel on 6/14/2023 and a normal TSH on 8/22/2024.      PAST MEDICAL HISTORY  Active Ambulatory Problems     Diagnosis Date Noted    No Active Ambulatory Problems     Resolved Ambulatory Problems     Diagnosis Date Noted    No Resolved Ambulatory Problems     No Additional Past Medical History         PAST SURGICAL HISTORY  No past surgical history on file.      FAMILY HISTORY  No family history on file.      SOCIAL HISTORY  Social History     Socioeconomic History    Marital status: Single   Tobacco Use    Smoking status: Never     Passive exposure: Never    Smokeless tobacco: Never   Vaping Use    Vaping status: Never Used    Substance and Sexual Activity    Alcohol use: Never    Drug use: Never    Sexual activity: Defer         ALLERGIES  Shellfish allergy and Shrimp      REVIEW OF SYSTEMS  Included in HPI  All systems reviewed and negative except for those discussed in HPI.      PHYSICAL EXAM    I have reviewed the triage vital signs and nursing notes.    ED Triage Vitals   Temp Heart Rate Resp BP SpO2   11/19/24 1610 11/19/24 1610 11/19/24 1610 11/19/24 1616 11/19/24 1610   97.1 °F (36.2 °C) 110 18 (!) 150/108 97 %      Temp src Heart Rate Source Patient Position BP Location FiO2 (%)   11/19/24 1610 -- 11/19/24 1616 11/19/24 1616 --   Tympanic  Standing Right arm        Physical Exam  Constitutional:       General: She is not in acute distress.     Appearance: She is well-developed.   HENT:      Head: Normocephalic and atraumatic.   Eyes:      General: No scleral icterus.     Conjunctiva/sclera: Conjunctivae normal.   Neck:      Trachea: No tracheal deviation.   Cardiovascular:      Rate and Rhythm: Normal rate and regular rhythm.   Pulmonary:      Effort: Pulmonary effort is normal.      Breath sounds: Normal breath sounds.   Abdominal:      Palpations: Abdomen is soft.      Tenderness: There is no abdominal tenderness.   Musculoskeletal:         General: No deformity.      Cervical back: Normal range of motion.      Right hip: Tenderness present.   Lymphadenopathy:      Cervical: No cervical adenopathy.   Skin:     General: Skin is warm and dry.   Neurological:      Mental Status: She is alert and oriented to person, place, and time.   Psychiatric:         Behavior: Behavior normal.         Vital signs and nursing notes reviewed.      PPE: I wore a surgical mask throughout my encounters with the pt. I performed hand hygiene on entry into the pt room and upon exit.     LAB RESULTS  Recent Results (from the past 24 hours)   Comprehensive Metabolic Panel    Collection Time: 11/19/24  4:54 PM    Specimen: Blood   Result Value Ref  Range    Glucose 102 (H) 65 - 99 mg/dL    BUN 16 6 - 20 mg/dL    Creatinine 0.92 0.57 - 1.00 mg/dL    Sodium 139 136 - 145 mmol/L    Potassium 3.8 3.5 - 5.2 mmol/L    Chloride 102 98 - 107 mmol/L    CO2 23.0 22.0 - 29.0 mmol/L    Calcium 9.9 8.6 - 10.5 mg/dL    Total Protein 7.8 6.0 - 8.5 g/dL    Albumin 4.9 3.5 - 5.2 g/dL    ALT (SGPT) 94 (H) 1 - 33 U/L    AST (SGOT) 109 (H) 1 - 32 U/L    Alkaline Phosphatase 64 39 - 117 U/L    Total Bilirubin 0.8 0.0 - 1.2 mg/dL    Globulin 2.9 gm/dL    A/G Ratio 1.7 g/dL    BUN/Creatinine Ratio 17.4 7.0 - 25.0    Anion Gap 14.0 5.0 - 15.0 mmol/L    eGFR 91.0 >60.0 mL/min/1.73   Sedimentation Rate    Collection Time: 11/19/24  4:54 PM    Specimen: Blood   Result Value Ref Range    Sed Rate 7 0 - 20 mm/hr   C-reactive Protein    Collection Time: 11/19/24  4:54 PM    Specimen: Blood   Result Value Ref Range    C-Reactive Protein 0.37 0.00 - 0.50 mg/dL   CBC Auto Differential    Collection Time: 11/19/24  4:54 PM    Specimen: Blood   Result Value Ref Range    WBC 12.68 (H) 3.40 - 10.80 10*3/mm3    RBC 3.77 3.77 - 5.28 10*6/mm3    Hemoglobin 12.3 12.0 - 15.9 g/dL    Hematocrit 36.1 34.0 - 46.6 %    MCV 95.8 79.0 - 97.0 fL    MCH 32.6 26.6 - 33.0 pg    MCHC 34.1 31.5 - 35.7 g/dL    RDW 12.9 12.3 - 15.4 %    RDW-SD 45.5 37.0 - 54.0 fl    MPV 11.6 6.0 - 12.0 fL    Platelets 286 140 - 450 10*3/mm3    Neutrophil % 76.2 (H) 42.7 - 76.0 %    Lymphocyte % 14.8 (L) 19.6 - 45.3 %    Monocyte % 6.8 5.0 - 12.0 %    Eosinophil % 1.6 0.3 - 6.2 %    Basophil % 0.3 0.0 - 1.5 %    Immature Grans % 0.3 0.0 - 0.5 %    Neutrophils, Absolute 9.66 (H) 1.70 - 7.00 10*3/mm3    Lymphocytes, Absolute 1.88 0.70 - 3.10 10*3/mm3    Monocytes, Absolute 0.86 0.10 - 0.90 10*3/mm3    Eosinophils, Absolute 0.20 0.00 - 0.40 10*3/mm3    Basophils, Absolute 0.04 0.00 - 0.20 10*3/mm3    Immature Grans, Absolute 0.04 0.00 - 0.05 10*3/mm3    nRBC 0.0 0.0 - 0.2 /100 WBC   hCG, Serum, Qualitative    Collection Time: 11/19/24   6:01 PM    Specimen: Blood   Result Value Ref Range    HCG Qualitative Negative Negative         RADIOLOGY  CT Pelvis Without Contrast    Result Date: 11/19/2024  CT PELVIS WITHOUT IV CONTRAST  HISTORY: Right hip pain, tenderness over the lateral aspect  TECHNIQUE: Radiation dose reduction techniques were utilized, including automated exposure control and exposure modulation based on body size. 3 mm images were obtained through the pelvis without intravenous contrast.  COMPARISON: None      FINDINGS AND IMPRESSION: No adenopathy by size criteria within the visualized pelvis. Hypodense right adnexal lesion measuring 2.6 cm within normal its for a premenopausal patient. Is the patient.  No pelvic fracture is seen. If there is continued ventral concern for right hip injury/pathology, further evaluation with right hip MRI should be considered   This report was finalized on 11/19/2024 8:16 PM by Dr. Hubert Martinez M.D on Workstation: [id number]      XR Hip With or Without Pelvis 2 - 3 View Right, XR Femur 2 View Right    Result Date: 11/19/2024  XR HIP W OR WO PELVIS 2-3 VIEW RIGHT-, XR FEMUR 2 VW RIGHT-   INDICATION: Pain no known injury.  COMPARISON: None  TECHNIQUE: 1 view of the pelvis and 2 views of the right hip and 2 views of the right femur  FINDINGS:  No fracture. No bone lesion. No dislocation. Preserved hip joint spaces. Normal SI joints. Preserved knee joint space. No knee effusion seen.      No fracture or dislocation. Preserved joint spaces.  This report was finalized on 11/19/2024 3:20 PM by Dr. Redd Brown M.D on Workstation: [id number]         MEDICATIONS GIVEN IN ER  Medications   sodium chloride 0.9 % flush 10 mL (has no administration in time range)   morphine injection 4 mg (4 mg Intravenous Given 11/19/24 1658)   ketorolac (TORADOL) injection 30 mg (30 mg Intravenous Given 11/19/24 1658)   HYDROmorphone (DILAUDID) injection 0.5 mg (0.5 mg Intravenous Given 11/19/24 2205)         ORDERS PLACED  DURING THIS VISIT:  Orders Placed This Encounter   Procedures    CT Pelvis Without Contrast    Comprehensive Metabolic Panel    Sedimentation Rate    C-reactive Protein    CBC Auto Differential    hCG, Serum, Qualitative    Insert Peripheral IV    CBC & Differential         OUTPATIENT MEDICATION MANAGEMENT:  Current Facility-Administered Medications Ordered in Epic   Medication Dose Route Frequency Provider Last Rate Last Admin    sodium chloride 0.9 % flush 10 mL  10 mL Intravenous PRN Braydon Tolbert MD         Current Outpatient Medications Ordered in Epic   Medication Sig Dispense Refill    albuterol sulfate  (90 Base) MCG/ACT inhaler Inhale 2 puffs.      baclofen (LIORESAL) 10 MG tablet Take 1 tablet by mouth 2 (Two) Times a Day As Needed for Muscle Spasms for up to 10 days. 20 tablet 0    betamethasone valerate (VALISONE) 0.1 % cream Apply 1 application  topically to the appropriate area as directed Daily.      cetirizine (zyrTEC) 10 MG tablet Take 1 tablet by mouth Every 12 (Twelve) Hours.      diclofenac (VOLTAREN) 50 MG EC tablet Take 1 tablet by mouth 3 (Three) Times a Day. 30 tablet 0    fluorometholone (FML) 0.1 % ophthalmic suspension Administer 1 drop to both eyes 2 (Two) Times a Day.      hydrOXYzine (ATARAX) 25 MG tablet TAKE 1 TO 2 TABLETS BY MOUTH EVERY NIGHT AT NOON AND AT BEDTIME AS NEEDED FOR ITCHING      lidocaine (LIDODERM) 5 % Place 1 patch on the skin as directed by provider Daily. Remove & Discard patch within 12 hours or as directed by MD 6 patch 0    mupirocin (BACTROBAN) 2 % ointment Apply 1 application  topically to the appropriate area as directed Daily.      Rinvoq 15 MG tablet sustained-release 24 hour Take 1 tablet by mouth Daily.                PROGRESS, DATA ANALYSIS, CONSULTS, AND MEDICAL DECISION MAKING  All labs have been independently interpreted by me.  All radiology studies have been reviewed by me. All EKG's have been independently viewed and interpreted by me.   Discussion below represents my analysis of pertinent findings related to patient's condition, differential diagnosis, treatment plan and final disposition.    Patient presentation and evaluation most consistent with acute right hip pain.  Symptoms are consistent with trochanteric bursitis but the patient is unable to stand or bear weight after reassessment at this time.  She has been treated with IV narcotics and anti-inflammatories without much relief.  She actually reports that her symptoms have worsened since arriving in the ER.  Plan for observation admission at this time as opposed to discharge like we had discussed.  Hope for orthopedic consultation in the morning with PT evaluation.    DIFFERENTIAL DIAGNOSIS INCLUDE BUT NOT LIMITED TO:     Hip contusion, trochanteric bursitis, lumbar radiculopathy    Clinical Scores: N/A      ED Course as of 11/19/24 0076   Tue Nov 19, 2024 1718 WBC(!): 12.68 [DC]   2159 Patient reassessed at this time.  No worrisome findings on reassessment at this time.  No worrisome findings on labs or CT imaging.  Her symptoms have improved but she persist with some aching discomfort to the lateral aspect of the hip.  Plan for outpatient management at this time with supportive care, anti-inflammatories, orthopedic follow-up for presumed trochanteric bursitis.  Patient agreeable with all questions answered. [DC]   3027 I discussed the case with ZULMA Loredo with [facility] at this time regarding the patient.  I discussed work-up, results, concerns.  I discussed the consulting provider's desire for obs admit.    [DC]      ED Course User Index  [DC] Julio Redding PA         3893  I rechecked the patient.  I discussed the patient's labs, radiology findings (including all incidental findings), diagnosis, and plan for admission. The patient understands and agrees with the plan.      AS OF 22:54 EST VITALS:    BP - 120/73  HR - 110  TEMP - 97.1 °F (36.2 °C) (Tympanic)  O2 SATS - 97%    COMPLEXITY OF  CARE  Admission was considered but after careful review of the patient's presentation, physical examination, diagnostic results, and response to treatment the patient may be safely discharged with outpatient follow-up.      DIAGNOSIS  Final diagnoses:   Trochanteric bursitis of right hip   Acute right hip pain   Intractable pain         DISPOSITION  ED Disposition       ED Disposition   Decision to Admit    Condition   --    Comment   --                Please note that portions of this document were completed with a voice recognition program.    Note Disclaimer: At [facility], we believe that sharing information builds trust and better relationships. You are receiving this note because you recently visited [facility]. It is possible you will see health information before a provider has talked with you about it. This kind of information can be easy to misunderstand. To help you fully understand what it means for your health, we urge you to discuss this note with your provider.         Julio Redding PA  11/20/24 1448

## 2024-11-20 ENCOUNTER — APPOINTMENT (OUTPATIENT)
Dept: MRI IMAGING | Facility: HOSPITAL | Age: 19
End: 2024-11-20
Payer: COMMERCIAL

## 2024-11-20 VITALS
BODY MASS INDEX: 32.6 KG/M2 | DIASTOLIC BLOOD PRESSURE: 76 MMHG | SYSTOLIC BLOOD PRESSURE: 125 MMHG | TEMPERATURE: 98.6 F | WEIGHT: 184 LBS | HEART RATE: 87 BPM | HEIGHT: 63 IN | OXYGEN SATURATION: 98 % | RESPIRATION RATE: 16 BRPM

## 2024-11-20 PROCEDURE — 97161 PT EVAL LOW COMPLEX 20 MIN: CPT

## 2024-11-20 PROCEDURE — 25010000002 KETOROLAC TROMETHAMINE PER 15 MG: Performed by: PHYSICIAN ASSISTANT

## 2024-11-20 PROCEDURE — 63710000001 METHYLPREDNISOLONE 4 MG TABLET THERAPY PACK 21 EACH DISP PACK: Performed by: PHYSICIAN ASSISTANT

## 2024-11-20 PROCEDURE — 97165 OT EVAL LOW COMPLEX 30 MIN: CPT

## 2024-11-20 PROCEDURE — G0378 HOSPITAL OBSERVATION PER HR: HCPCS

## 2024-11-20 PROCEDURE — 73721 MRI JNT OF LWR EXTRE W/O DYE: CPT

## 2024-11-20 PROCEDURE — 96376 TX/PRO/DX INJ SAME DRUG ADON: CPT

## 2024-11-20 RX ORDER — SODIUM CHLORIDE 0.9 % (FLUSH) 0.9 %
10 SYRINGE (ML) INJECTION EVERY 12 HOURS SCHEDULED
Status: DISCONTINUED | OUTPATIENT
Start: 2024-11-20 | End: 2024-11-20 | Stop reason: HOSPADM

## 2024-11-20 RX ORDER — LIDOCAINE 4 G/G
1 PATCH TOPICAL
Status: DISCONTINUED | OUTPATIENT
Start: 2024-11-20 | End: 2024-11-20 | Stop reason: HOSPADM

## 2024-11-20 RX ORDER — ONDANSETRON 2 MG/ML
4 INJECTION INTRAMUSCULAR; INTRAVENOUS EVERY 6 HOURS PRN
Status: DISCONTINUED | OUTPATIENT
Start: 2024-11-20 | End: 2024-11-20 | Stop reason: HOSPADM

## 2024-11-20 RX ORDER — METHYLPREDNISOLONE 4 MG/1
4 TABLET ORAL
Status: COMPLETED | OUTPATIENT
Start: 2024-11-20 | End: 2024-11-20

## 2024-11-20 RX ORDER — METHYLPREDNISOLONE 4 MG/1
8 TABLET ORAL
Status: COMPLETED | OUTPATIENT
Start: 2024-11-20 | End: 2024-11-20

## 2024-11-20 RX ORDER — METHOCARBAMOL 750 MG/1
750 TABLET, FILM COATED ORAL EVERY 8 HOURS PRN
Status: DISCONTINUED | OUTPATIENT
Start: 2024-11-20 | End: 2024-11-20 | Stop reason: HOSPADM

## 2024-11-20 RX ORDER — METHYLPREDNISOLONE 4 MG/1
4 TABLET ORAL
Status: DISCONTINUED | OUTPATIENT
Start: 2024-11-21 | End: 2024-11-20 | Stop reason: HOSPADM

## 2024-11-20 RX ORDER — BISACODYL 10 MG
10 SUPPOSITORY, RECTAL RECTAL DAILY PRN
Status: DISCONTINUED | OUTPATIENT
Start: 2024-11-20 | End: 2024-11-20 | Stop reason: HOSPADM

## 2024-11-20 RX ORDER — RUXOLITINIB 15 MG/G
1 CREAM TOPICAL DAILY PRN
COMMUNITY
Start: 2024-10-30

## 2024-11-20 RX ORDER — METHYLPREDNISOLONE 4 MG/1
4 TABLET ORAL
Status: DISCONTINUED | OUTPATIENT
Start: 2024-11-24 | End: 2024-11-20 | Stop reason: HOSPADM

## 2024-11-20 RX ORDER — METHYLPREDNISOLONE 4 MG/1
4 TABLET ORAL
Status: DISCONTINUED | OUTPATIENT
Start: 2024-11-20 | End: 2024-11-20 | Stop reason: HOSPADM

## 2024-11-20 RX ORDER — SODIUM CHLORIDE 9 MG/ML
40 INJECTION, SOLUTION INTRAVENOUS AS NEEDED
Status: DISCONTINUED | OUTPATIENT
Start: 2024-11-20 | End: 2024-11-20 | Stop reason: HOSPADM

## 2024-11-20 RX ORDER — METHYLPREDNISOLONE 4 MG/1
4 TABLET ORAL
Status: DISCONTINUED | OUTPATIENT
Start: 2024-11-22 | End: 2024-11-20 | Stop reason: HOSPADM

## 2024-11-20 RX ORDER — METHYLPREDNISOLONE 4 MG/1
8 TABLET ORAL
Status: DISCONTINUED | OUTPATIENT
Start: 2024-11-21 | End: 2024-11-20 | Stop reason: HOSPADM

## 2024-11-20 RX ORDER — METHYLPREDNISOLONE 4 MG/1
8 TABLET ORAL
Status: DISCONTINUED | OUTPATIENT
Start: 2024-11-20 | End: 2024-11-20 | Stop reason: HOSPADM

## 2024-11-20 RX ORDER — ALBUTEROL SULFATE 0.83 MG/ML
2.5 SOLUTION RESPIRATORY (INHALATION) EVERY 6 HOURS PRN
Status: DISCONTINUED | OUTPATIENT
Start: 2024-11-20 | End: 2024-11-20 | Stop reason: HOSPADM

## 2024-11-20 RX ORDER — AMOXICILLIN 250 MG
2 CAPSULE ORAL 2 TIMES DAILY PRN
Status: DISCONTINUED | OUTPATIENT
Start: 2024-11-20 | End: 2024-11-20 | Stop reason: HOSPADM

## 2024-11-20 RX ORDER — BISACODYL 5 MG/1
5 TABLET, DELAYED RELEASE ORAL DAILY PRN
Status: DISCONTINUED | OUTPATIENT
Start: 2024-11-20 | End: 2024-11-20 | Stop reason: HOSPADM

## 2024-11-20 RX ORDER — KETOROLAC TROMETHAMINE 30 MG/ML
30 INJECTION, SOLUTION INTRAMUSCULAR; INTRAVENOUS EVERY 6 HOURS PRN
Status: DISCONTINUED | OUTPATIENT
Start: 2024-11-20 | End: 2024-11-20 | Stop reason: HOSPADM

## 2024-11-20 RX ORDER — METHYLPREDNISOLONE 4 MG/1
4 TABLET ORAL
Status: DISCONTINUED | OUTPATIENT
Start: 2024-11-25 | End: 2024-11-20 | Stop reason: HOSPADM

## 2024-11-20 RX ORDER — METHYLPREDNISOLONE 4 MG/1
4 TABLET ORAL
Status: DISCONTINUED | OUTPATIENT
Start: 2024-11-23 | End: 2024-11-20 | Stop reason: HOSPADM

## 2024-11-20 RX ORDER — SODIUM CHLORIDE 0.9 % (FLUSH) 0.9 %
10 SYRINGE (ML) INJECTION AS NEEDED
Status: DISCONTINUED | OUTPATIENT
Start: 2024-11-20 | End: 2024-11-20 | Stop reason: HOSPADM

## 2024-11-20 RX ORDER — ACETAMINOPHEN 325 MG/1
650 TABLET ORAL EVERY 4 HOURS PRN
Status: DISCONTINUED | OUTPATIENT
Start: 2024-11-20 | End: 2024-11-20 | Stop reason: HOSPADM

## 2024-11-20 RX ORDER — POLYETHYLENE GLYCOL 3350 17 G/17G
17 POWDER, FOR SOLUTION ORAL DAILY PRN
Status: DISCONTINUED | OUTPATIENT
Start: 2024-11-20 | End: 2024-11-20 | Stop reason: HOSPADM

## 2024-11-20 RX ORDER — IBUPROFEN 800 MG/1
800 TABLET, FILM COATED ORAL EVERY 6 HOURS PRN
Qty: 45 TABLET | Refills: 0 | Status: SHIPPED | OUTPATIENT
Start: 2024-11-20

## 2024-11-20 RX ADMIN — KETOROLAC TROMETHAMINE 30 MG: 30 INJECTION, SOLUTION INTRAMUSCULAR at 08:52

## 2024-11-20 RX ADMIN — METHOCARBAMOL 750 MG: 750 TABLET ORAL at 01:12

## 2024-11-20 RX ADMIN — LIDOCAINE 1 PATCH: 4 PATCH TOPICAL at 01:12

## 2024-11-20 RX ADMIN — KETOROLAC TROMETHAMINE 30 MG: 30 INJECTION, SOLUTION INTRAMUSCULAR at 01:12

## 2024-11-20 RX ADMIN — METHYLPREDNISOLONE 4 MG: 4 TABLET ORAL at 13:54

## 2024-11-20 RX ADMIN — Medication 10 ML: at 01:39

## 2024-11-20 RX ADMIN — METHYLPREDNISOLONE 8 MG: 4 TABLET ORAL at 08:48

## 2024-11-20 RX ADMIN — Medication 10 ML: at 08:50

## 2024-11-20 NOTE — DISCHARGE INSTRUCTIONS
Your MRI shows Acetabular labrum tear  Take ibuprofen to help reduce inflammation  Follow-up with Ortho outpatient

## 2024-11-20 NOTE — CONSULTS
Orthopaedic Consultation      Patient: Sherry Arriaga    Date of Admission: 11/19/2024  4:17 PM    YOB: 2005    Medical Record Number: [id number]    Attending Physician:  Facundo Bautista MD    Consulting Physician:  Marlon Shannon PA-C        Chief Complaints: Severe right hip pain    History of Present Illness: 19 y.o. female admitted to [facility] with above complaint.  Patient states she has had increasing right lateral hip pain for the past 2 weeks.  No known injury.  Has had issues with his hip before and was diagnosed with a muscle strain previously.  Denies any recent trauma.  Denies long-term steroid use.  Denies history of sickle cell anemia.  Patient states the pain gradually began to increase to the point where she was unable to weight-bear.  Patient presented to the ER and was admitted to the observation unit. I was consulted for further evaluation and treatment.     Allergies:   Allergies   Allergen Reactions    Shellfish Allergy Anaphylaxis and Swelling    Shrimp Anaphylaxis     of mouth   of mouth       Medications:   Home Medications:  Medications Prior to Admission   Medication Sig Dispense Refill Last Dose/Taking    cetirizine (zyrTEC) 10 MG tablet Take 1 tablet by mouth Every 12 (Twelve) Hours.   11/19/2024    Rinvoq 15 MG tablet sustained-release 24 hour Take 1 tablet by mouth Daily.   11/19/2024    albuterol sulfate  (90 Base) MCG/ACT inhaler Inhale 2 puffs.   Unknown    baclofen (LIORESAL) 10 MG tablet Take 1 tablet by mouth 2 (Two) Times a Day As Needed for Muscle Spasms for up to 10 days. 20 tablet 0     betamethasone valerate (VALISONE) 0.1 % cream Apply 1 Application topically to the appropriate area as directed Daily As Needed for Rash.   Unknown    fluorometholone (FML) 0.1 % ophthalmic suspension Administer 1 drop to both eyes 2 (Two) Times a Day.       hydrOXYzine (ATARAX) 25 MG tablet TAKE 1 TO 2 TABLETS BY MOUTH EVERY NIGHT AT NOON AND AT BEDTIME AS  NEEDED FOR ITCHING       mupirocin (BACTROBAN) 2 % ointment Apply 1 Application topically to the appropriate area as directed Daily As Needed.   Unknown    Opzelura 1.5 % cream Apply 1 Application topically to the appropriate area as directed Daily As Needed.   Unknown       Current Medications:  Scheduled Meds:Lidocaine, 1 patch, Transdermal, Q24H  sodium chloride, 10 mL, Intravenous, Q12H      Continuous Infusions:   PRN Meds:.  acetaminophen    albuterol    senna-docusate sodium **AND** polyethylene glycol **AND** bisacodyl **AND** bisacodyl    Calcium Replacement - Follow Nurse / BPA Driven Protocol    ketorolac    Magnesium Standard Dose Replacement - Follow Nurse / BPA Driven Protocol    melatonin    methocarbamol    ondansetron    Phosphorus Replacement - Follow Nurse / BPA Driven Protocol    Potassium Replacement - Follow Nurse / BPA Driven Protocol    [COMPLETED] Insert Peripheral IV **AND** sodium chloride    sodium chloride    sodium chloride    Past Medical History:   Diagnosis Date    Asthma     Eczema      History reviewed. No pertinent surgical history.  Social History     Occupational History    Not on file   Tobacco Use    Smoking status: Never     Passive exposure: Never    Smokeless tobacco: Never   Vaping Use    Vaping status: Never Used   Substance and Sexual Activity    Alcohol use: Never    Drug use: Never    Sexual activity: Defer      Social History     Social History Narrative    Not on file     History reviewed. No pertinent family history.      Review of Systems:   No other pertinent positives or negatives other than what is mentioned in the HPI and below.  Constitutional: Negative for fatigue, fever, or weight loss  HEENT: No active headache.  Pulmonary: Patient denies SOA.  Cardiovascular: Patient denies any chest pain.  Gastrointestinal:  Patient denies active vomiting or diarrhea.  Musculoskeletal: Positive for right lateral hip pain.  Neurological: Patient denies active dizziness or  "loss of consciousness.  Skin: Patient denies any active bleeding.    Vital signs in last 24 hours:  Temp:  [97.1 °F (36.2 °C)-98.9 °F (37.2 °C)] 98.9 °F (37.2 °C)  Heart Rate:  [] 93  Resp:  [16-18] 16  BP: (102-150)/() 112/59  Vitals:    11/20/24 0016 11/20/24 0018 11/20/24 0055 11/20/24 0501   BP: 107/88 102/56  112/59   BP Location:  Right arm  Right arm   Patient Position:  Lying  Lying   Pulse: 76 73  93   Resp:  18  16   Temp:  98.8 °F (37.1 °C)  98.9 °F (37.2 °C)   TempSrc:  Oral  Oral   SpO2:  95%  97%   Weight:  83.9 kg (184 lb 15.5 oz) 83.5 kg (184 lb)    Height:  160 cm (62.99\")            Physical Exam: 19 y.o. female         General Appearance:  Alert, cooperative, in no acute distress    HEENT:    Atraumatic, Pupils are equal   Neck:   Cervical spine midline, no appreciable JVD   Lungs:     Breathing non-labored and chest rise symmetric    Heart:   Abdomen:     Rectal:       Extremities:   Pulses  Neurovascular:   Skin:   Musculoskeletal:      Pulse regular    Soft, Non-tender or distended    Deferred        No clubbing, cyanosis, or edema    Intact    Cranial nerves 2 - 12 grossly intact, sensation intact    No skin lesions  No effusion.  Atraumatic.  Able to SLR.  Tender palpation over lateral greater trochanteric bursa.  No fluctuance.  Passive range of motion tolerated well.  Pain with internal and external rotation at terminal extremes.  Felt laterally.  Calf supple nontender.  Neurovascular intact distally.     Diagnostic Tests:    Results from last 7 days   Lab Units 11/19/24  1654   WBC 10*3/mm3 12.68*   HEMOGLOBIN g/dL 12.3   HEMATOCRIT % 36.1   PLATELETS 10*3/mm3 286     Results from last 7 days   Lab Units 11/19/24  1654   SODIUM mmol/L 139   POTASSIUM mmol/L 3.8   CHLORIDE mmol/L 102   CO2 mmol/L 23.0   BUN mg/dL 16   CREATININE mg/dL 0.92   GLUCOSE mg/dL 102*   CALCIUM mg/dL 9.9             Assessment:    Right hip pain        Plan: Discussed treatment diagnostic options with " patient today  Mother at bedside  Questions answered best ability  Patient presenting with what appears to be greater trochanteric bursitis  Conceivably could also represent a gluteal tendon tear  Will order MRI of the right hip to more fully evaluate  Will also start Medrol Dosepak  Weight-bear as tolerated  Will continue to follow  Further recommendations based on MRI results  Discussed with attending physician    The patient voiced understanding of the risks, benefits, and alternative forms of treatment that were discussed and the patient consents to proceed with MRI and medrol dosepack.     Date: 11/20/2024  Marlon Shannon PA-C

## 2024-11-20 NOTE — PLAN OF CARE
Goal Outcome Evaluation:  Plan of Care Reviewed With: patient           Outcome Evaluation: Pt seen for OT eval after admission 2/2 R hip pain. Pt is WBAT per ortho notes and w/u pending for greater trochanteric bursitis. Pt agreeable to mobilize and able to do so with SBA using FWW. Pt was educated on compensatory strategies for LB ADLs as well as safety with AD and navigating while in pain. Pt verbalized understanding of all and states she has adequate assist from family upon d/c. No further acute OT needs at this time and recommend d/c to home with prn assist and FWW.    Anticipated Discharge Disposition (OT): home with assist

## 2024-11-20 NOTE — PROGRESS NOTES
WADE HERNANDEZ Attestation Note    I supervised care provided by the midlevel provider.    The KOURTNEY and I have discussed this patient's history, physical exam, and treatment plan. I have reviewed the documentation and personally had a face to face interaction with the patient  I affirm the documentation and agree with the treatment and plan. I provided a substantive portion of the care of this patient.  I personally performed the physical exam, in its entirety.  My personal findings are documented in below:    History:  19-year-old female presented to the ED complaining of 2 weeks of right hip pain.  She reports comfort at this time in bed.  Tolerated breakfast though did not like the offerings very much.    Physical Exam:  General: No acute distress.  HENT: NCAT, PERRL, Nares patent.  Eyes: no scleral icterus.  Neck: trachea midline,   CV: Pink warm and well-perfused throughout  Respiratory: No distress or increased work of breathing  Musculoskeletal: no deformity.  Neuro: alert, speech fluent and easily intelligible  Skin: warm, dry.    Assessment and Plan:  Right hip pain: Seen by orthopedics who recommended MRI.  Plan PT consult as well for disposition planning thereafter.

## 2024-11-20 NOTE — THERAPY EVALUATION
Patient Name: Sherry Arriaga  : 2005    MRN: [id number]                              Today's Date: 2024       Admit Date: 2024    Visit Dx:     ICD-10-CM ICD-9-CM   1. Trochanteric bursitis of right hip  M70.61 726.5   2. Acute right hip pain  M25.551 719.45   3. Intractable pain  R52 780.96     Patient Active Problem List   Diagnosis    Right hip pain     Past Medical History:   Diagnosis Date    Asthma     Eczema      History reviewed. No pertinent surgical history.   General Information       Row Name 24 1000          Physical Therapy Time and Intention    Document Type evaluation  -     Mode of Treatment individual therapy;physical therapy  -       Row Name 24 1000          General Information    Patient Profile Reviewed yes  -     Prior Level of Function independent:  -[facility] Name 24 1000          Living Environment    People in Home grandparent(s)  -SM       Row Name 24 1000          Home Main Entrance    Number of Stairs, Main Entrance two  -[facility] Name 24 1000          Cognition    Orientation Status (Cognition) oriented x 4  -[facility] Name 24 1000          Safety Issues/Impairments Affecting Functional Mobility    Impairments Affecting Function (Mobility) pain;strength;range of motion (ROM)  -               User Key  (r) = Recorded By, (t) = Taken By, (c) = Cosigned By      Initials Name Provider Type     Ann Russo PT Physical Therapist                   Mobility       [facility] Name 24 1001          Bed Mobility    Bed Mobility supine-sit;sit-supine  -     Supine-Sit [place] (Bed Mobility) minimum assist (75% patient effort)  -     Sit-Supine [place] (Bed Mobility) minimum assist (75% patient effort)  -     Assistive Device (Bed Mobility) head of bed elevated  -[facility] Name 24 1001          Sit-Stand Transfer    Sit-Stand [place] (Transfers) contact guard  -[facility] Name 24  1001          Gait/Stairs (Locomotion)    [place] Level (Gait) contact guard  -SM     Distance in Feet (Gait) 5  -SM     Deviations/Abnormal Patterns (Gait) antalgic;gait speed decreased;alex decreased  -     Comment, (Gait/Stairs) Gait slow and antalgic with distance limited by pain.  -SM               User Key  (r) = Recorded By, (t) = Taken By, (c) = Cosigned By      Initials Name Provider Type     Ann Russo PT Physical Therapist                   Obj/Interventions       Row Name 11/20/24 1001          Range of Motion Comprehensive    General Range of Motion bilateral lower extremity ROM WFL  -       Row Name 11/20/24 1001          Strength Comprehensive (MMT)    General Manual Muscle Testing (MMT) Assessment lower extremity strength deficits identified  -     Comment, General Manual Muscle Testing (MMT) Assessment R LE limited by pain  -       Row Name 11/20/24 1001          Balance    Balance Assessment sitting static balance;sitting dynamic balance;standing static balance;standing dynamic balance  -     Static Sitting Balance independent  -SM     Dynamic Sitting Balance modified independence  -SM     Position, Sitting Balance sitting edge of bed  -     Static Standing Balance standby assist  -SM     Dynamic Standing Balance contact guard  -SM     Position/Device Used, Standing Balance unsupported  -     Balance Interventions sitting;standing;sit to stand;static;dynamic  -SM               User Key  (r) = Recorded By, (t) = Taken By, (c) = Cosigned By      Initials Name Provider Type     Ann Russo PT Physical Therapist                   Goals/Plan       Row Name 11/20/24 1006          Bed Mobility Goal 1 (PT)    Activity/Assistive Device (Bed Mobility Goal 1, PT) bed mobility activities, all  -     [place] Level/Cues Needed (Bed Mobility Goal 1, PT) independent  -SM     Time Frame (Bed Mobility Goal 1, PT) 1 week  -       Row Name 11/20/24 1006           Transfer Goal 1 (PT)    Activity/Assistive Device (Transfer Goal 1, PT) sit-to-stand/stand-to-sit;bed-to-chair/chair-to-bed  -SM     [place] Level/Cues Needed (Transfer Goal 1, PT) independent  -SM     Time Frame (Transfer Goal 1, PT) 1 week  -SM       Row Name 11/20/24 1006          Gait Training Goal 1 (PT)    Activity/Assistive Device (Gait Training Goal 1, PT) gait (walking locomotion)  -SM     [place] Level (Gait Training Goal 1, PT) independent  -SM     Distance (Gait Training Goal 1, PT) 150ft  -SM     Time Frame (Gait Training Goal 1, PT) 1 week  -SM               User Key  (r) = Recorded By, (t) = Taken By, (c) = Cosigned By      Initials Name Provider Type     Ann Russo, PT Physical Therapist                   Clinical Impression       Row Name 11/20/24 1002          Pain    Pretreatment Pain Rating 6/10  -SM     Posttreatment Pain Rating 6/10  -SM     Pain Location hip  -SM     Pain Side/Orientation right  -SM     Pain Management Interventions nursing notified;premedicated for activity  -SM     Response to Pain Interventions activity and movement patterns unchanged  -       Row Name 11/20/24 1002          Plan of Care Review    Plan of Care Reviewed With patient  -SM     Outcome Evaluation Patient is a 19 y.o female who presented to [facility] with R hip pain. WBAT per ortho notes. Patient AOx4 supine in bed upon arrival. Patient lives at home with her grandmother with 2 LIZ. Patient is typically independent at baseline. Patient completed all bed mobility with Keesha. Patient performed STS from EOB with CGA. Patient ambulated 5ft from EOB to stretcher with CGA. Gait slow and antalgic with distance limited by pain. Patient may continue to benefit from skilled PT intervention while in the acute care setting to address deficits in functional mobility. Anticipate return home with assist and OP PT at d/c. Acute PT will continue to monitor.  -       Row Name 11/20/24 1002          Therapy  Assessment/Plan (PT)    Rehab Potential (PT) good  -     Criteria for Skilled Interventions Met (PT) yes;skilled treatment is necessary  -     Therapy Frequency (PT) 3 times/wk  -       Row Name 11/20/24 1002          Vital Signs    Pre Patient Position Supine  -SM     Intra Patient Position Standing  -SM     Post Patient Position Supine  -SM       Row Name 11/20/24 1002          Positioning and Restraints    Pre-Treatment Position in bed  -SM     Post Treatment Position other  -SM     Other Position with other staff  stretcher with transport  -               User Key  (r) = Recorded By, (t) = Taken By, (c) = Cosigned By      Initials Name Provider Type     Ann Russo PT Physical Therapist                   Outcome Measures       Row Name 11/20/24 1006 11/20/24 0100       How much help from another person do you currently need...    Turning from your back to your side while in flat bed without using bedrails? 3  -SM 3  -AG    Moving from lying on back to sitting on the side of a flat bed without bedrails? 3  -SM 3  -AG    Moving to and from a bed to a chair (including a wheelchair)? 3  -SM 3  -AG    Standing up from a chair using your arms (e.g., wheelchair, bedside chair)? 3  -SM 3  -AG    Climbing 3-5 steps with a railing? 2  -SM 2  -AG    To walk in hospital room? 3  -SM 2  -AG    AM-PAC 6 Clicks Score (PT) 17  -SM 16  -AG    Highest Level of Mobility Goal 5 --> Static standing  -SM 5 --> Static standing  -AG      Row Name 11/20/24 1006          Functional Assessment    Outcome Measure Options AM-PAC 6 Clicks Basic Mobility (PT)  -               User Key  (r) = Recorded By, (t) = Taken By, (c) = Cosigned By      Initials Name Provider Type    AG Shanta Cain, RN Registered Nurse     Ann Russo PT Physical Therapist                                 Physical Therapy Education       Title: PT OT SLP Therapies (Done)       Topic: Physical Therapy (Done)       Point: Mobility training  (Done)       Learning Progress Summary            Patient Acceptance, E, VU by  at 11/20/2024 1007                      Point: Home exercise program (Done)       Learning Progress Summary            Patient Acceptance, E, VU by  at 11/20/2024 1007                      Point: Body mechanics (Done)       Learning Progress Summary            Patient Acceptance, E, VU by  at 11/20/2024 1007                      Point: Precautions (Done)       Learning Progress Summary            Patient Acceptance, E, VU by  at 11/20/2024 1007                                      User Key       Initials Effective Dates Name Provider Type Discipline     05/02/22 -  Ann Russo PT Physical Therapist PT                  PT Recommendation and Plan     Outcome Evaluation: Patient is a 19 y.o female who presented to [facility] with R hip pain. WBAT per ortho notes. Patient AOx4 supine in bed upon arrival. Patient lives at home with her grandmother with 2 LIZ. Patient is typically independent at baseline. Patient completed all bed mobility with Keesha. Patient performed STS from EOB with CGA. Patient ambulated 5ft from EOB to stretcher with CGA. Gait slow and antalgic with distance limited by pain. Patient may continue to benefit from skilled PT intervention while in the acute care setting to address deficits in functional mobility. Anticipate return home with assist and OP PT at d/c. Acute PT will continue to monitor.     Time Calculation:         PT Charges       Row Name 11/20/24 [facility]             Time Calculation    Start Time 0850  -      Stop Time 0858  -      Time Calculation (min) 8 min  -      PT Received On 11/20/24  -      PT - Next Appointment 11/22/24  -      PT Goal Re-Cert Due Date 11/27/24  -                User Key  (r) = Recorded By, (t) = Taken By, (c) = Cosigned By      Initials Name Provider Type     Ann Russo PT Physical Therapist                  Therapy Charges for Today       Code  Description Service Date Service Provider Modifiers Qty    53735096125 HC PT EVAL LOW COMPLEXITY 3 11/20/2024 Ann Russo, PT GP 1            PT G-Codes  Outcome Measure Options: AM-PAC 6 Clicks Basic Mobility (PT)  AM-PAC 6 Clicks Score (PT): 17  PT Discharge Summary  Anticipated Discharge Disposition (PT): home with assist, home with outpatient therapy services    Ann Russo PT  11/20/2024

## 2024-11-20 NOTE — H&P
[facility]   HISTORY AND PHYSICAL    Patient Name: Sherry Arriaga  : 2005  MRN: [id number]  Primary Care Physician:  Zoya Jeffries MD  Date of admission: 2024    Subjective   Subjective     Chief Complaint:   Chief Complaint   Patient presents with    Hip Pain         HPI:    Sherry Arriaga is a 19 y.o. female who comes in complaining of right hip pain for the past 2 weeks.  Patient states she was walking when it began to hurt.  Patient states she has had progressively worsening pain throughout that time.  Patient states pain is over her right hip.  Patient states it is sore and tender to the touch.  Patient states she was seen at urgent care about 1 week ago and was prescribed baclofen and ibuprofen and has had minimal relief.  Patient does report a history of sciatica issues but states that this feels different.  Patient denies any numbness and tingling of bilateral lower extremities, patient denies any saddle anesthesia, urinary or bowel dysfunction, urinary symptoms or back pain.  Patient denies any falls or injury.    In the ED, AST and ALT mildly elevated at 109 and 94 respectively which were previously elevated which were 55 and 68 respectively.  ESR and CRP normal.  hCG negative.  White blood cell count of 12.6 otherwise unremarkable CBC.  X-ray right femur shows no acute findings.  X-ray right hip shows no acute findings.  CT pelvis no acute findings apparent.  Hypodense right adnexal lesion measuring 2.6 cm within normal for this premenopausal patient.  Patient is afebrile, pulse in 70s, on room air oxygen 95% SpO2 and blood pressure normotensive.  Patient was given Toradol, Dilaudid, morphine in the ED.        Review of Systems   All systems were reviewed and negative except for: as per HPI    Personal History     Past Medical History:   Diagnosis Date    Asthma     Eczema        History reviewed. No pertinent surgical history.    Family History: family history is not on file.  Otherwise pertinent FHx was reviewed and not pertinent to current issue.    Social History:  reports that she has never smoked. She has never been exposed to tobacco smoke. She has never used smokeless tobacco. She reports that she does not drink alcohol and does not use drugs.    Home Medications:  Ruxolitinib Phosphate, Upadacitinib ER, albuterol sulfate HFA, baclofen, betamethasone valerate, cetirizine, diclofenac, fluorometholone, hydrOXYzine, lidocaine, and mupirocin    Allergies:  Allergies   Allergen Reactions    Shellfish Allergy Anaphylaxis and Swelling    Shrimp Anaphylaxis     of mouth   of mouth       Objective   Objective     Vitals:   Temp:  [97.1 °F (36.2 °C)-98.8 °F (37.1 °C)] 98.8 °F (37.1 °C)  Heart Rate:  [] 73  Resp:  [16-18] 18  BP: (102-150)/() 102/56  Physical Exam    Constitutional: Awake, alert   Eyes: PERRLA, sclerae anicteric, no conjunctival injection   HENT: NCAT, mucous membranes moist   Neck: Supple, no thyromegaly, no lymphadenopathy, trachea midline   Respiratory: Clear to auscultation bilaterally, nonlabored respirations    Cardiovascular: RRR, no murmurs, rubs, or gallops, palpable pedal pulses bilaterally   Gastrointestinal: Positive bowel sounds, soft, nontender, nondistended   Musculoskeletal: No bilateral ankle edema, no clubbing or cyanosis to extremities   Psychiatric: Appropriate affect, cooperative   Neurologic: Oriented x 3, strength symmetric in all extremities, Cranial Nerves grossly intact to confrontation, speech clear   Skin: No rashes     Result Review    Result Review:  I have personally reviewed the results from the time of this admission to 11/20/2024 01:09 EST and agree with these findings:  [x]  Laboratory list / accordion  []  Microbiology  [x]  Radiology  []  EKG/Telemetry   []  Cardiology/Vascular   []  Pathology  []  Old records  []  Other:  Most notable findings include: See above      Assessment & Plan   Assessment / Plan     Brief Patient  Summary:  Sherry Arriaga is a 19 y.o. female who who comes in complaining of right hip pain    Active Hospital Problems:  Active Hospital Problems    Diagnosis     **Right hip pain      Plan:       Right hip pain, intractable pain  Trochanteric bursitis of right hip  -ESR and CRP normal.  hCG negative.    -White blood cell count of 12.6 otherwise unremarkable CBC.  -  X-ray right femur shows no acute findings.   - X-ray right hip shows no acute findings.    -CT pelvis no acute findings apparent.  Hypodense right adnexal lesion measuring 2.6 cm within normal for this premenopausal patient.    -Patient is afebrile, pulse in 70s, on room air oxygen 95% SpO2 and blood pressure normotensive.   - Patient was given Toradol, Dilaudid, morphine in the ED.  -Patient feels she cannot weight-bear at this time secondary to pain  -Orthopedic surgery consult  -PT OT consult  -Pain control with Robaxin, Tylenol, Toradol  -Regular diet    Mildly elevated LFTs, appears to be chronic  -AST and ALT mildly elevated at 109 and 94 respectively which were previously elevated which were 55 and 68 respectively.  Follow-up outpatient    Obesity, BMI 32, encourage lifestyle modifications      VTE Prophylaxis:[facility]  Mechanical VTE prophylaxis orders are present.        CODE STATUS:    Code Status (Patient has no pulse and is not breathing): CPR (Attempt to Resuscitate)  Medical Interventions (Patient has pulse or is breathing): Full Support    Admission Status:  I believe this patient meets observation status.    77 minutes have been spent by [facility] providers in the care of this patient while under observation status.      Appropriate PPE worn during patient encounter.  Hand hygeine performed before and after seeing the patient.      Electronically signed by ZULMA Soto, 11/20/24, 1:01 AM EST.

## 2024-11-20 NOTE — PLAN OF CARE
Goal Outcome Evaluation:      Pt dc via private vehicle with belongings. Crutches and medication sent with patient. IV removed. Education provided. Questions encouraged and answered.

## 2024-11-20 NOTE — THERAPY DISCHARGE NOTE
Acute Care - Occupational Therapy Discharge  [facility]    Patient Name: Sherry Arriaga  : 2005    MRN: [id number]                              Today's Date: 2024       Admit Date: 2024    Visit Dx:     ICD-10-CM ICD-9-CM   1. Trochanteric bursitis of right hip  M70.61 726.5   2. Acute right hip pain  M25.551 719.45   3. Intractable pain  R52 780.96     Patient Active Problem List   Diagnosis    Right hip pain     Past Medical History:   Diagnosis Date    Asthma     Eczema      History reviewed. No pertinent surgical history.   General Information       Row Name 24 1248          OT Time and Intention    Subjective Information complains of;pain  -CE     Document Type discharge evaluation/summary  -CE     Mode of Treatment occupational therapy  -CE     Patient Effort good  -CE     Symptoms Noted During/After Treatment none  -CE       Row Name 24 1248          General Information    Patient Profile Reviewed yes  -CE     Prior Level of Function independent:;ADL's;community mobility  -CE     Existing Precautions/Restrictions no known precautions/restrictions  -CE     Barriers to Rehab none identified  -CE       Row Name 24 1248          Living Environment    People in Home grandparent(s)  -CE       Row Name 24 1248          Home Main Entrance    Number of Stairs, Main Entrance two  -CE     Stair Railings, Main Entrance none  -CE       Row Name 24 1248          Stairs Within Home, Primary    Stairs, Within Home, Primary laundry in basement but pt states family can assists with this  -CE       Row Name 24 1248          Cognition    Orientation Status (Cognition) oriented x 4  -CE       Row Name 24 1248          Safety Issues/Impairments Affecting Functional Mobility    Impairments Affecting Function (Mobility) pain  -CE               User Key  (r) = Recorded By, (t) = Taken By, (c) = Cosigned By      Initials Name Provider Type    CE Kailey Starr, OT  Occupational Therapist                   Mobility/ADL's       Row Name 11/20/24 1249          Bed Mobility    Bed Mobility supine-sit;sit-supine  -CE     Supine-Sit Moca (Bed Mobility) standby assist  -CE     Sit-Supine Moca (Bed Mobility) minimum assist (75% patient effort)  -CE     Assistive Device (Bed Mobility) head of bed elevated  -CE     Comment, (Bed Mobility) assist for RLE elevation back into bed and increased time required 2/2 pain  -CE       Row Name 11/20/24 1249          Transfers    Transfers sit-stand transfer;toilet transfer  -CE       Row Name 11/20/24 1249          Sit-Stand Transfer    Sit-Stand Moca (Transfers) standby assist  -CE     Assistive Device (Sit-Stand Transfers) walker, front-wheeled  -CE       Row Name 11/20/24 1249          Toilet Transfer    Type (Toilet Transfer) sit-stand;stand-sit  -CE     Moca Level (Toilet Transfer) standby assist  -CE     Assistive Device (Toilet Transfer) commode, bedside without drop arms;walker, front-wheeled  -CE     Comment, (Toilet Transfer) educated pt on WBAT and use of AD to manage pain when standing  -CE       Row Name 11/20/24 1249          Functional Mobility    Functional Mobility- Comment SBA-CGA w/ FWW; able to ambulate short distance in room and declined transferring to recliner 2/2 pain increases with seated vs. supine.  -CE       Row Name 11/20/24 1249          Activities of Daily Living    BADL Assessment/Intervention lower body dressing  -CE       Row Name 11/20/24 1249          Lower Body Dressing Assessment/Training    Moca Level (Lower Body Dressing) don;shoes/slippers;set up;standby assist  -CE     Position (Lower Body Dressing) edge of bed sitting  -CE               User Key  (r) = Recorded By, (t) = Taken By, (c) = Cosigned By      Initials Name Provider Type    Kailey Javier OT Occupational Therapist                   Obj/Interventions       Row Name 11/20/24 1251          Sensory  Assessment (Somatosensory)    Sensory Assessment (Somatosensory) sensation intact  -       Row Name 11/20/24 1251          Vision Assessment/Intervention    Visual Impairment/Limitations WFL  -       Row Name 11/20/24 1251          Range of Motion Comprehensive    General Range of Motion bilateral upper extremity ROM WNL;bilateral lower extremity ROM WFL  -       Row Name 11/20/24 1251          Strength Comprehensive (MMT)    Comment, General Manual Muscle Testing (MMT) Assessment RLE limited by pain but >/= 3/5 ; WFL LLE and BUE  -       Row Name 11/20/24 1251          Balance    Dynamic Standing Balance standby assist  -CE     Position/Device Used, Standing Balance supported;walker, front-wheeled  -               User Key  (r) = Recorded By, (t) = Taken By, (c) = Cosigned By      Initials Name Provider Type    CE Kailey Starr, JUAN CARLOS Occupational Therapist                   Goals/Plan    No documentation.                  Clinical Impression       Row Name 11/20/24 1252          Pain Assessment    Pretreatment Pain Rating 6/10  -CE     Posttreatment Pain Rating 6/10  -CE     Pain Location hip  -     Pain Side/Orientation right  -     Pain Management Interventions diversional activity provided;positioning techniques utilized  -       Row Name 11/20/24 1252          Plan of Care Review    Plan of Care Reviewed With patient  -CE     Outcome Evaluation Pt seen for OT eval after admission 2/2 R hip pain. Pt is WBAT per ortho notes and w/u pending for greater trochanteric bursitis. Pt agreeable to mobilize and able to do so with SBA using FWW. Pt was educated on compensatory strategies for LB ADLs as well as safety with AD and navigating while in pain. Pt verbalized understanding of all and states she has adequate assist from family upon d/c. No further acute OT needs at this time and recommend d/c to home with prn assist and FWW.  -       Row Name 11/20/24 1252          Therapy Assessment/Plan (OT)     Rehab Potential (OT) good  -CE     Criteria for Skilled Therapeutic Interventions Met (OT) no problems identified which require skilled intervention  -CE       Row Name 11/20/24 1252          Therapy Plan Review/Discharge Plan (OT)    Equipment Needs Upon Discharge (OT) walker, rolling  -CE     Anticipated Discharge Disposition (OT) home with assist  -CE       Row Name 11/20/24 1252          Vital Signs    O2 Delivery Pre Treatment room air  -CE     O2 Delivery Intra Treatment room air  -CE     O2 Delivery Post Treatment room air  -CE     Pre Patient Position Supine  -CE     Intra Patient Position Standing  -CE     Post Patient Position Supine  -CE       Row Name 11/20/24 1252          Positioning and Restraints    Pre-Treatment Position in bed  -CE     Post Treatment Position bed  -CE     In Bed notified nsg;supine;fowlers;encouraged to call for assist;call light within reach  -CE               User Key  (r) = Recorded By, (t) = Taken By, (c) = Cosigned By      Initials Name Provider Type    CE Kailey Starr, OT Occupational Therapist                   Outcome Measures       Row Name 11/20/24 1256          How much help from another is currently needed...    Putting on and taking off regular lower body clothing? 3  -CE     Bathing (including washing, rinsing, and drying) 4  -CE     Toileting (which includes using toilet bed pan or urinal) 4  -CE     Putting on and taking off regular upper body clothing 4  -CE     Taking care of personal grooming (such as brushing teeth) 4  -CE     Eating meals 4  -CE     AM-PAC 6 Clicks Score (OT) 23  -CE       Row Name 11/20/24 1006 11/20/24 1002       How much help from another person do you currently need...    Turning from your back to your side while in flat bed without using bedrails? 3  -SM 3  -HN    Moving from lying on back to sitting on the side of a flat bed without bedrails? 3  -SM 3  -HN    Moving to and from a bed to a chair (including a wheelchair)? 3  -SM 3   -HN    Standing up from a chair using your arms (e.g., wheelchair, bedside chair)? 3  -SM 3  -HN    Climbing 3-5 steps with a railing? 2  -SM 2  -HN    To walk in hospital room? 3  -SM 3  -HN    AM-PAC 6 Clicks Score (PT) 17  -SM 17  -HN    Highest Level of Mobility Goal 5 --> Static standing  -SM 5 --> Static standing  -HN      Row Name 11/20/24 0100          How much help from another person do you currently need...    Turning from your back to your side while in flat bed without using bedrails? 3  -AG     Moving from lying on back to sitting on the side of a flat bed without bedrails? 3  -AG     Moving to and from a bed to a chair (including a wheelchair)? 3  -AG     Standing up from a chair using your arms (e.g., wheelchair, bedside chair)? 3  -AG     Climbing 3-5 steps with a railing? 2  -AG     To walk in hospital room? 2  -AG     AM-PAC 6 Clicks Score (PT) 16  -AG     Highest Level of Mobility Goal 5 --> Static standing  -AG       Row Name 11/20/24 1256 11/20/24 1006       Functional Assessment    Outcome Measure Options AM-PAC 6 Clicks Daily Activity (OT)  -CE AM-PAC 6 Clicks Basic Mobility (PT)  -SM              User Key  (r) = Recorded By, (t) = Taken By, (c) = Cosigned By      Initials Name Provider Type    Shanta Dennis, RN Registered Nurse    Lynette Stout, RN Registered Nurse    Ann Hooks, PT Physical Therapist    Kailey Javier, OT Occupational Therapist                  Occupational Therapy Education       Title: PT OT SLP Therapies (Done)       Topic: Occupational Therapy (Done)       Point: ADL training (Done)       Description:   Instruct learner(s) on proper safety adaptation and remediation techniques during self care or transfers.   Instruct in proper use of assistive devices.                  Learning Progress Summary            Patient Acceptance, E, VU by CE at 11/20/2024 1256                      Point: Precautions (Done)       Description:   Instruct learner(s) on  prescribed precautions during self-care and functional transfers.                  Learning Progress Summary            Patient Acceptance, E, VU by CE at 11/20/2024 1256                      Point: Body mechanics (Done)       Description:   Instruct learner(s) on proper positioning and spine alignment during self-care, functional mobility activities and/or exercises.                  Learning Progress Summary            Patient Acceptance, E, VU by CE at 11/20/2024 1256                                      User Key       Initials Effective Dates Name Provider Type Discipline    CE 10/17/22 -  Kailey Starr OT Occupational Therapist OT                  OT Recommendation and Plan     Plan of Care Review  Plan of Care Reviewed With: patient  Outcome Evaluation: Pt seen for OT eval after admission 2/2 R hip pain. Pt is WBAT per ortho notes and w/u pending for greater trochanteric bursitis. Pt agreeable to mobilize and able to do so with SBA using FWW. Pt was educated on compensatory strategies for LB ADLs as well as safety with AD and navigating while in pain. Pt verbalized understanding of all and states she has adequate assist from family upon d/c. No further acute OT needs at this time and recommend d/c to home with prn assist and FWW.  Plan of Care Reviewed With: patient  Outcome Evaluation: Pt seen for OT eval after admission 2/2 R hip pain. Pt is WBAT per ortho notes and w/u pending for greater trochanteric bursitis. Pt agreeable to mobilize and able to do so with SBA using FWW. Pt was educated on compensatory strategies for LB ADLs as well as safety with AD and navigating while in pain. Pt verbalized understanding of all and states she has adequate assist from family upon d/c. No further acute OT needs at this time and recommend d/c to home with prn assist and FWW.     Time Calculation:   Evaluation Complexity (OT)  Review Occupational Profile/Medical/Therapy History Complexity: brief/low  complexity  Assessment, Occupational Performance/Identification of Deficit Complexity: 1-3 performance deficits  Clinical Decision Making Complexity (OT): problem focused assessment/low complexity  Overall Complexity of Evaluation (OT): low complexity     Time Calculation- OT       Row Name 11/20/24 1257             Time Calculation- OT    OT Start Time 1121  -CE      OT Stop Time 1132  -CE      OT Time Calculation (min) 11 min  -CE      OT Received On 11/20/24  -CE                User Key  (r) = Recorded By, (t) = Taken By, (c) = Cosigned By      Initials Name Provider Type    Kailey Javier OT Occupational Therapist                  Therapy Charges for Today       Code Description Service Date Service Provider Modifiers Qty    [id number]  OT EVAL LOW COMPLEXITY 3 11/20/2024 Kailey Starr OT GO 1               OT Discharge Summary  Anticipated Discharge Disposition (OT): home with assist    Kailey Starr OT  11/20/2024

## 2024-11-20 NOTE — PLAN OF CARE
Goal Outcome Evaluation:  Plan of Care Reviewed With: patient           Outcome Evaluation: Patient is a 19 y.o female who presented to [facility] with R hip pain. WBAT per ortho notes. Patient AOx4 supine in bed upon arrival. Patient lives at home with her grandmother with 2 LIZ. Patient is typically independent at baseline. Patient completed all bed mobility with Keesha. Patient performed STS from EOB with CGA. Patient ambulated 5ft from EOB to stretcher with CGA. Gait slow and antalgic with distance limited by pain. Patient may continue to benefit from skilled PT intervention while in the acute care setting to address deficits in functional mobility. Anticipate return home with assist and OP PT at d/c. Acute PT will continue to monitor.    Anticipated Discharge Disposition (PT): home with assist, home with outpatient therapy services

## 2024-11-20 NOTE — ED NOTES
Nursing report ED to floor  Sherry Arriaga  19 y.o.  female    HPI :  HPI  Stated Reason for Visit: R hip pain x2 weeks. NKI. was given baclofen and ibuprofen without relief. NKI. ambulatory with difficulty.    Chief Complaint  Chief Complaint   Patient presents with    Hip Pain       Admitting doctor:   Christopher MCGHEE MD    Admitting diagnosis:   The primary encounter diagnosis was Trochanteric bursitis of right hip. Diagnoses of Acute right hip pain and Intractable pain were also pertinent to this visit.    Code status:   Current Code Status       Date Active Code Status Order ID Comments User Context       Not on file            Allergies:   Shellfish allergy and Shrimp    Isolation:   No active isolations    Intake and Output  No intake or output data in the 24 hours ending 11/20/24 0010    Weight:   There were no vitals filed for this visit.    Most recent vitals:   Vitals:    11/19/24 1610 11/19/24 1616 11/19/24 1621   BP:  (!) 150/108 120/73   BP Location:  Right arm    Patient Position:  Standing    Pulse: 110     Resp: 18     Temp: 97.1 °F (36.2 °C)     TempSrc: Tympanic     SpO2: 97%         Active LDAs/IV Access:   Lines, Drains & Airways       Active LDAs       Name Placement date Placement time Site Days    Peripheral IV 11/19/24 1654 Right Antecubital 11/19/24  1654  Antecubital  less than 1                    Labs (abnormal labs have a star):   Labs Reviewed   COMPREHENSIVE METABOLIC PANEL - Abnormal; Notable for the following components:       Result Value    Glucose 102 (*)     ALT (SGPT) 94 (*)     AST (SGOT) 109 (*)     All other components within normal limits    Narrative:     GFR Normal >60  Chronic Kidney Disease <60  Kidney Failure <15     CBC WITH AUTO DIFFERENTIAL - Abnormal; Notable for the following components:    WBC 12.68 (*)     Neutrophil % 76.2 (*)     Lymphocyte % 14.8 (*)     Neutrophils, Absolute 9.66 (*)     All other components within normal limits   SEDIMENTATION RATE -  Normal   C-REACTIVE PROTEIN - Normal   HCG, SERUM, QUALITATIVE - Normal   CBC AND DIFFERENTIAL    Narrative:     The following orders were created for panel order CBC & Differential.  Procedure                               Abnormality         Status                     ---------                               -----------         ------                     CBC Auto Differential[[id number]]        Abnormal            Final result                 Please view results for these tests on the individual orders.       EKG:   No orders to display       Meds given in ED:   Medications   sodium chloride 0.9 % flush 10 mL (has no administration in time range)   morphine injection 4 mg (4 mg Intravenous Given 11/19/24 1658)   ketorolac (TORADOL) injection 30 mg (30 mg Intravenous Given 11/19/24 1658)   HYDROmorphone (DILAUDID) injection 0.5 mg (0.5 mg Intravenous Given 11/19/24 2205)       Imaging results:  CT Pelvis Without Contrast    Result Date: 11/19/2024  FINDINGS AND IMPRESSION: No adenopathy by size criteria within the visualized pelvis. Hypodense right adnexal lesion measuring 2.6 cm within normal its for a premenopausal patient. Is the patient.  No pelvic fracture is seen. If there is continued ventral concern for right hip injury/pathology, further evaluation with right hip MRI should be considered   This report was finalized on 11/19/2024 8:16 PM by Dr. Hubert Martinez M.D on Workstation: [id number]      XR Hip With or Without Pelvis 2 - 3 View Right    Result Date: 11/19/2024  No fracture or dislocation. Preserved joint spaces.  This report was finalized on 11/19/2024 3:20 PM by Dr. Redd Brown M.D on Workstation: [id number]      XR Femur 2 View Right    Result Date: 11/19/2024  No fracture or dislocation. Preserved joint spaces.  This report was finalized on 11/19/2024 3:20 PM by Dr. Redd Brown M.D on Workstation: [id number]       Ambulatory status:   - Standby    Social issues:   Social History      Socioeconomic History    Marital status: Single   Tobacco Use    Smoking status: Never     Passive exposure: Never    Smokeless tobacco: Never   Vaping Use    Vaping status: Never Used   Substance and Sexual Activity    Alcohol use: Never    Drug use: Never    Sexual activity: Defer       Peripheral Neurovascular  Peripheral Neurovascular (Adult)  Peripheral Neurovascular WDL: WDL    Neuro Cognitive  Neuro Cognitive (Adult)  Cognitive/Neuro/Behavioral WDL: WDL    Learning  Learning Assessment  Learning Readiness and Ability: no barriers identified    Respiratory  Respiratory WDL  Respiratory WDL: WDL    Abdominal Pain       Pain Assessments  Pain (Adult)  (0-10) Pain Rating: Rest: 5  Patient requested Medication Prescribed for Lower Pain Scale: No  Pain Location: hip  Pain Side/Orientation: right    NIH Stroke Scale       Maeve Colin RN  11/20/24 00:10 EST

## 2024-11-20 NOTE — ED PROVIDER NOTES
EMERGENCY DEPARTMENT MD ATTESTATION NOTE    Room Number:  02/02  PCP: Zoya Jeffries MD  Independent Historians: Patient and Family    HPI:    Context: Sherry Arriaga is a 19 y.o. female who presents to the ED c/o acute right hip pain.  Symptoms been occurring for 2 weeks.  Patient has been seen twice on outpatient basis without improvement in symptoms.  Pain is located in the right hip.  Pain was atraumatic in onset.        PHYSICAL EXAM    I have reviewed the triage vital signs and nursing notes.    ED Triage Vitals   Temp Heart Rate Resp BP SpO2   11/19/24 1610 11/19/24 1610 11/19/24 1610 11/19/24 1616 11/19/24 1610   97.1 °F (36.2 °C) 110 18 (!) 150/108 97 %      Temp src Heart Rate Source Patient Position BP Location FiO2 (%)   11/19/24 1610 -- 11/19/24 1616 11/19/24 1616 --   Tympanic  Standing Right arm        Physical Exam  GENERAL: alert, no acute distress  SKIN: Warm, dry  HENT: Normocephalic, atraumatic  EYES: no scleral icterus  CV: regular rhythm, regular rate  RESPIRATORY: normal effort, lungs clear  ABDOMEN: soft, nontender, nondistended  MUSCULOSKELETAL: no deformity.  Tenderness palpation of the right hip  NEURO: alert, moves all extremities, follows commands            MEDICATIONS GIVEN IN ER  Medications   sodium chloride 0.9 % flush 10 mL (has no administration in time range)   morphine injection 4 mg (4 mg Intravenous Given 11/19/24 1658)   ketorolac (TORADOL) injection 30 mg (30 mg Intravenous Given 11/19/24 1658)         ORDERS PLACED DURING THIS VISIT:  Orders Placed This Encounter   Procedures    CT Pelvis Without Contrast    Comprehensive Metabolic Panel    Sedimentation Rate    C-reactive Protein    CBC Auto Differential    hCG, Serum, Qualitative    Insert Peripheral IV    CBC & Differential         PROCEDURES  Procedures            PROGRESS, DATA ANALYSIS, CONSULTS, AND MEDICAL DECISION MAKING  All labs have been independently interpreted by me.  All radiology studies have been  reviewed by me. All EKG's have been independently viewed and interpreted by me.  Discussion below represents my analysis of pertinent findings related to patient's condition, differential diagnosis, treatment plan and final disposition.    Differential diagnosis includes but is not limited to contusion, fracture, dislocation.    Clinical Scores:                                     ED Course as of 11/20/24 2300   Tue Nov 19, 2024   1718 WBC(!): 12.68 [DC]   2159 Patient reassessed at this time.  No worrisome findings on reassessment at this time.  No worrisome findings on labs or CT imaging.  Her symptoms have improved but she persist with some aching discomfort to the lateral aspect of the hip.  Plan for outpatient management at this time with supportive care, anti-inflammatories, orthopedic follow-up for presumed trochanteric bursitis.  Patient agreeable with all questions answered. [DC]   5600 I discussed the case with ZULMA Loredo with [facility] at this time regarding the patient.  I discussed work-up, results, concerns.  I discussed the consulting provider's desire for obs admit.    [DC]      ED Course User Index  [DC] Julio Redding PA       MDM: 19-year-old female presenting for evaluation of right hip pain that has not improved despite 2 outpatient visits.  Radiological evaluation is overall unremarkable.  Will admit for further evaluation and management.      COMPLEXITY OF CARE  The patient requires admission.    Please note that portions of this document were completed with a voice recognition program.    Note Disclaimer: At [facility], we believe that sharing information builds trust and better relationships. You are receiving this note because you recently visited [facility]. It is possible you will see health information before a provider has talked with you about it. This kind of information can be easy to misunderstand. To help you fully understand what it means for your health, we urge you to  discuss this note with your provider.         Braydon Tolbert MD  11/20/24 6532

## 2024-11-20 NOTE — PROGRESS NOTES
ED OBSERVATION PROGRESS/DISCHARGE SUMMARY    Date of Admission: 11/19/2024   LOS: 0 days   PCP: Zoya Jeffries MD        Subjective     Hospital Outcome:   19-year-old female was seen and examined at bedside following admission to the observation unit due to ongoing right hip pain for the past 2 weeks.  Initially patient was seen at urgent care a week ago and was prescribed baclofen and ibuprofen with no improvement.  Symptoms exacerbated with weightbearing and walking.    Laboratory evaluation shows mildly elevated AST and ALT at 109 and 94 respectively which were previously elevated which were 55 and 68 respectively. ESR and CRP normal. hCG negative. White blood cell count of 12.6 otherwise unremarkable CBC. X-ray right femur shows no acute findings. X-ray right hip shows no acute findings. CT pelvis no acute findings apparent. Hypodense right adnexal lesion measuring 2.6 cm within normal for this premenopausal patient. Patient is afebrile, pulse in 70s, on room air oxygen 95% SpO2 and blood pressure normotensive. Patient was given Toradol, Dilaudid, morphine in the ED.     No acute event overnight however patient continues to complain of right hip pain.  Ortho evaluated patient and has ordered MRI and started patient on Medrol Dosepak.    MRI right hip without shows partial-thickness undersurface tear/detachment of the superior right vestibular labrum.  Discussed the findings with Marlon Angeles with Ortho and he recommends partial weightbearing and that patient to follow-up with Dr. Seymour with Ortho.  Discussed above findings and recommendation with patient and she is in agreement with the plan.    ROS:  General: no fevers, chills  Respiratory: no cough, dyspnea  Cardiovascular: no chest pain, palpitations  Abdomen: No abdominal pain, nausea, vomiting, or diarrhea  Neurologic: No focal weakness    Objective   Physical Exam:  I have reviewed the vital signs.  Temp:  [97.1 °F (36.2 °C)-98.9 °F (37.2 °C)] 98.9 °F  (37.2 °C)  Heart Rate:  [] 90  Resp:  [16-18] 16  BP: (102-150)/() 112/63  General Appearance:    Alert, cooperative, no distress  Head:    Normocephalic, atraumatic  Eyes:    Sclerae anicteric  Neck:   Supple, no mass  Lungs: Clear to auscultation bilaterally, respirations unlabored  Heart: Regular rate and rhythm, S1 and S2 normal, no murmur, rub or gallop  Abdomen:  Soft, nontender, bowel sounds active, nondistended  Extremities: No clubbing, cyanosis, or edema to lower extremities  Pulses:  2+ and symmetric in distal lower extremities  Skin: No rashes   Neurologic: Oriented x3, Normal strength to extremities    Results Review:    I have reviewed the labs, radiology results and diagnostic studies.    Results from last 7 days   Lab Units 11/19/24  1654   WBC 10*3/mm3 12.68*   HEMOGLOBIN g/dL 12.3   HEMATOCRIT % 36.1   PLATELETS 10*3/mm3 286     Results from last 7 days   Lab Units 11/19/24  1654   SODIUM mmol/L 139   POTASSIUM mmol/L 3.8   CHLORIDE mmol/L 102   CO2 mmol/L 23.0   BUN mg/dL 16   CREATININE mg/dL 0.92   CALCIUM mg/dL 9.9   BILIRUBIN mg/dL 0.8   ALK PHOS U/L 64   ALT (SGPT) U/L 94*   AST (SGOT) U/L 109*   GLUCOSE mg/dL 102*     Imaging Results (Last 24 Hours)       Procedure Component Value Units Date/Time    CT Pelvis Without Contrast [[id number]] Collected: 11/19/24 2008     Updated: 11/19/24 2019    Narrative:      CT PELVIS WITHOUT IV CONTRAST     HISTORY: Right hip pain, tenderness over the lateral aspect     TECHNIQUE: Radiation dose reduction techniques were utilized, including  automated exposure control and exposure modulation based on body size.   3 mm images were obtained through the pelvis without intravenous  contrast.     COMPARISON: None       Impression:      FINDINGS AND IMPRESSION:  No adenopathy by size criteria within the visualized pelvis. Hypodense  right adnexal lesion measuring 2.6 cm within normal its for a  premenopausal patient. Is the patient.     No pelvic  fracture is seen. If there is continued ventral concern for  right hip injury/pathology, further evaluation with right hip MRI should  be considered        This report was finalized on 11/19/2024 8:16 PM by Dr. Hubert Martinez M.D on Workstation: [id number]               I have reviewed the medications.     Discharge Medications        New Medications        Instructions Start Date   diclofenac 50 MG EC tablet  Commonly known as: VOLTAREN   50 mg, Oral, 3 Times Daily      lidocaine 5 %  Commonly known as: LIDODERM   1 patch, Transdermal, Every 24 Hours, Remove & Discard patch within 12 hours or as directed by MD             Continue These Medications        Instructions Start Date   albuterol sulfate  (90 Base) MCG/ACT inhaler  Commonly known as: PROVENTIL HFA;VENTOLIN HFA;PROAIR HFA   2 puffs, Inhalation      baclofen 10 MG tablet  Commonly known as: LIORESAL   10 mg, Oral, 2 Times Daily PRN      betamethasone valerate 0.1 % cream  Commonly known as: VALISONE   1 application , Topical, Daily PRN      cetirizine 10 MG tablet  Commonly known as: zyrTEC   1 tablet, Every 12 Hours Scheduled      fluorometholone 0.1 % ophthalmic suspension  Commonly known as: FML   1 drop, Both Eyes, 2 Times Daily      hydrOXYzine 25 MG tablet  Commonly known as: ATARAX   TAKE 1 TO 2 TABLETS BY MOUTH EVERY NIGHT AT NOON AND AT BEDTIME AS NEEDED FOR ITCHING      mupirocin 2 % ointment  Commonly known as: BACTROBAN   1 application , Topical, Daily PRN      Rinvoq 15 MG tablet sustained-release 24 hour  Generic drug: Upadacitinib ER   1 tablet, Daily             ASK your doctor about these medications        Instructions Start Date   Opzelura 1.5 % cream  Generic drug: Ruxolitinib Phosphate   1 Application, Daily PRN              ---------------------------------------------------------------------------------------------  Assessment & Plan   Assessment/Problem List    Right hip pain      Plan:    Right hip pain, intractable  pain  Trochanteric bursitis of right hip  -ESR and CRP normal.  hCG negative.    -White blood cell count of 12.6 otherwise unremarkable CBC.  -  X-ray right femur shows no acute findings.   - X-ray right hip shows no acute findings.    -CT pelvis no acute findings apparent.  Hypodense right adnexal lesion measuring 2.6 cm within normal for this premenopausal patient.    -Patient is afebrile, pulse in 70s, on room air oxygen 95% SpO2 and blood pressure normotensive.   - Patient was given Toradol, Dilaudid, morphine in the ED.  -Patient feels she cannot weight-bear at this time secondary to pain  -Orthopedic surgery consult  -PT OT consult  -Pain control with Robaxin, Tylenol, Toradol  -Regular diet     Mildly elevated LFTs, appears to be chronic  -AST and ALT mildly elevated at 109 and 94 respectively which were previously elevated which were 55 and 68 respectively.  Follow-up outpatient     Obesity, BMI 32, encourage lifestyle modifications    Disposition: Home    Follow-up after Discharge: Ortho    This note will serve as a discharge summary    DIANA Espino 11/20/24 08:43 EST    I have worn appropriate PPE during this patient encounter, sanitized my hands both with entering and exiting patient's room.      31 minutes has been spent by [facility] providers in the care of this patient while under observation status

## 2024-11-20 NOTE — PLAN OF CARE
Goal Outcome Evaluation:         Pt continues to complain of right hip pain. Had some improvement with analgesics. Orthopedic surgery, PT, and OT consulted.

## 2025-06-18 ENCOUNTER — LAB (OUTPATIENT)
Dept: LAB | Facility: HOSPITAL | Age: 20
End: 2025-06-18
Payer: COMMERCIAL

## 2025-06-18 ENCOUNTER — TRANSCRIBE ORDERS (OUTPATIENT)
Dept: LAB | Facility: HOSPITAL | Age: 20
End: 2025-06-18
Payer: COMMERCIAL

## 2025-06-18 DIAGNOSIS — L20.9 ATOPIC DERMATITIS, UNSPECIFIED TYPE: Primary | ICD-10-CM

## 2025-06-18 DIAGNOSIS — Z79.899 LONG TERM USE OF DRUG: ICD-10-CM

## 2025-06-18 DIAGNOSIS — L20.9 ATOPIC DERMATITIS, UNSPECIFIED TYPE: ICD-10-CM

## 2025-06-18 DIAGNOSIS — J30.1 ALLERGIC RHINITIS DUE TO POLLEN, UNSPECIFIED SEASONALITY: ICD-10-CM

## 2025-06-18 DIAGNOSIS — J45.40 MODERATE PERSISTENT ASTHMA, UNCOMPLICATED: ICD-10-CM

## 2025-06-18 LAB
ALBUMIN SERPL-MCNC: 4.4 G/DL (ref 3.5–5.2)
ALBUMIN/GLOB SERPL: 1.5 G/DL
ALP SERPL-CCNC: 52 U/L (ref 39–117)
ALT SERPL W P-5'-P-CCNC: 33 U/L (ref 1–33)
ANION GAP SERPL CALCULATED.3IONS-SCNC: 14.4 MMOL/L (ref 5–15)
AST SERPL-CCNC: 39 U/L (ref 1–32)
BASOPHILS # BLD AUTO: 0.03 10*3/MM3 (ref 0–0.2)
BASOPHILS NFR BLD AUTO: 0.4 % (ref 0–1.5)
BILIRUB SERPL-MCNC: 0.6 MG/DL (ref 0–1.2)
BUN SERPL-MCNC: 6 MG/DL (ref 6–20)
BUN/CREAT SERPL: 8.3 (ref 7–25)
CALCIUM SPEC-SCNC: 9 MG/DL (ref 8.6–10.5)
CHLORIDE SERPL-SCNC: 105 MMOL/L (ref 98–107)
CHOLEST SERPL-MCNC: 178 MG/DL (ref 0–200)
CO2 SERPL-SCNC: 22.6 MMOL/L (ref 22–29)
CREAT SERPL-MCNC: 0.72 MG/DL (ref 0.57–1)
DEPRECATED RDW RBC AUTO: 43.9 FL (ref 37–54)
EGFRCR SERPLBLD CKD-EPI 2021: 121.4 ML/MIN/1.73
EOSINOPHIL # BLD AUTO: 0.18 10*3/MM3 (ref 0–0.4)
EOSINOPHIL NFR BLD AUTO: 2.2 % (ref 0.3–6.2)
ERYTHROCYTE [DISTWIDTH] IN BLOOD BY AUTOMATED COUNT: 12.8 % (ref 12.3–15.4)
GLOBULIN UR ELPH-MCNC: 2.9 GM/DL
GLUCOSE SERPL-MCNC: 98 MG/DL (ref 65–99)
HCT VFR BLD AUTO: 34.1 % (ref 34–46.6)
HDLC SERPL-MCNC: 40 MG/DL (ref 40–60)
HGB BLD-MCNC: 11.2 G/DL (ref 12–15.9)
IMM GRANULOCYTES # BLD AUTO: 0.03 10*3/MM3 (ref 0–0.05)
IMM GRANULOCYTES NFR BLD AUTO: 0.4 % (ref 0–0.5)
LDLC SERPL CALC-MCNC: 75 MG/DL (ref 0–100)
LDLC/HDLC SERPL: 1.46 {RATIO}
LYMPHOCYTES # BLD AUTO: 2.89 10*3/MM3 (ref 0.7–3.1)
LYMPHOCYTES NFR BLD AUTO: 35.5 % (ref 19.6–45.3)
MCH RBC QN AUTO: 31.2 PG (ref 26.6–33)
MCHC RBC AUTO-ENTMCNC: 32.8 G/DL (ref 31.5–35.7)
MCV RBC AUTO: 95 FL (ref 79–97)
MONOCYTES # BLD AUTO: 0.52 10*3/MM3 (ref 0.1–0.9)
MONOCYTES NFR BLD AUTO: 6.4 % (ref 5–12)
NEUTROPHILS NFR BLD AUTO: 4.5 10*3/MM3 (ref 1.7–7)
NEUTROPHILS NFR BLD AUTO: 55.1 % (ref 42.7–76)
NRBC BLD AUTO-RTO: 0 /100 WBC (ref 0–0.2)
PLATELET # BLD AUTO: 309 10*3/MM3 (ref 140–450)
PMV BLD AUTO: 11.5 FL (ref 6–12)
POTASSIUM SERPL-SCNC: 3.7 MMOL/L (ref 3.5–5.2)
PROT SERPL-MCNC: 7.3 G/DL (ref 6–8.5)
RBC # BLD AUTO: 3.59 10*6/MM3 (ref 3.77–5.28)
SODIUM SERPL-SCNC: 142 MMOL/L (ref 136–145)
TRIGL SERPL-MCNC: 398 MG/DL (ref 0–150)
VLDLC SERPL-MCNC: 63 MG/DL (ref 5–40)
WBC NRBC COR # BLD AUTO: 8.15 10*3/MM3 (ref 3.4–10.8)

## 2025-06-18 PROCEDURE — 36415 COLL VENOUS BLD VENIPUNCTURE: CPT

## 2025-06-18 PROCEDURE — 80061 LIPID PANEL: CPT

## 2025-06-18 PROCEDURE — 80053 COMPREHEN METABOLIC PANEL: CPT

## 2025-06-18 PROCEDURE — 85025 COMPLETE CBC W/AUTO DIFF WBC: CPT
